# Patient Record
Sex: FEMALE | Race: ASIAN | NOT HISPANIC OR LATINO | URBAN - METROPOLITAN AREA
[De-identification: names, ages, dates, MRNs, and addresses within clinical notes are randomized per-mention and may not be internally consistent; named-entity substitution may affect disease eponyms.]

---

## 2019-11-07 ENCOUNTER — EMERGENCY (EMERGENCY)
Facility: HOSPITAL | Age: 24
LOS: 1 days | Discharge: HOME | End: 2019-11-07
Attending: EMERGENCY MEDICINE
Payer: MEDICAID

## 2019-11-07 VITALS
HEART RATE: 58 BPM | SYSTOLIC BLOOD PRESSURE: 100 MMHG | HEIGHT: 61 IN | RESPIRATION RATE: 18 BRPM | OXYGEN SATURATION: 99 % | WEIGHT: 128.97 LBS | TEMPERATURE: 98 F | DIASTOLIC BLOOD PRESSURE: 66 MMHG

## 2019-11-07 LAB
ALBUMIN SERPL ELPH-MCNC: 4.6 G/DL — SIGNIFICANT CHANGE UP (ref 3.3–5)
ALP SERPL-CCNC: 82 U/L — SIGNIFICANT CHANGE UP (ref 40–120)
ALT FLD-CCNC: 27 U/L — SIGNIFICANT CHANGE UP (ref 10–45)
ANION GAP SERPL CALC-SCNC: 12 MMOL/L — SIGNIFICANT CHANGE UP (ref 5–17)
AST SERPL-CCNC: 24 U/L — SIGNIFICANT CHANGE UP (ref 10–40)
BILIRUB SERPL-MCNC: 0.4 MG/DL — SIGNIFICANT CHANGE UP (ref 0.2–1.2)
BUN SERPL-MCNC: 13 MG/DL — SIGNIFICANT CHANGE UP (ref 7–23)
CALCIUM SERPL-MCNC: 9.8 MG/DL — SIGNIFICANT CHANGE UP (ref 8.4–10.5)
CHLORIDE SERPL-SCNC: 101 MMOL/L — SIGNIFICANT CHANGE UP (ref 96–108)
CO2 SERPL-SCNC: 25 MMOL/L — SIGNIFICANT CHANGE UP (ref 22–31)
CREAT SERPL-MCNC: 0.7 MG/DL — SIGNIFICANT CHANGE UP (ref 0.5–1.3)
GLUCOSE SERPL-MCNC: 85 MG/DL — SIGNIFICANT CHANGE UP (ref 70–99)
HCG UR QL: NEGATIVE — SIGNIFICANT CHANGE UP
HCT VFR BLD CALC: 38.5 % — SIGNIFICANT CHANGE UP (ref 34.5–45)
HGB BLD-MCNC: 12.8 G/DL — SIGNIFICANT CHANGE UP (ref 11.5–15.5)
MAGNESIUM SERPL-MCNC: 2 MG/DL — SIGNIFICANT CHANGE UP (ref 1.6–2.6)
MCHC RBC-ENTMCNC: 29.7 PG — SIGNIFICANT CHANGE UP (ref 27–34)
MCHC RBC-ENTMCNC: 33.2 GM/DL — SIGNIFICANT CHANGE UP (ref 32–36)
MCV RBC AUTO: 89.3 FL — SIGNIFICANT CHANGE UP (ref 80–100)
NRBC # BLD: 0 /100 WBCS — SIGNIFICANT CHANGE UP (ref 0–0)
PLATELET # BLD AUTO: 256 K/UL — SIGNIFICANT CHANGE UP (ref 150–400)
POTASSIUM SERPL-MCNC: 4.2 MMOL/L — SIGNIFICANT CHANGE UP (ref 3.5–5.3)
POTASSIUM SERPL-SCNC: 4.2 MMOL/L — SIGNIFICANT CHANGE UP (ref 3.5–5.3)
PROT SERPL-MCNC: 8.3 G/DL — SIGNIFICANT CHANGE UP (ref 6–8.3)
RBC # BLD: 4.31 M/UL — SIGNIFICANT CHANGE UP (ref 3.8–5.2)
RBC # FLD: 11.5 % — SIGNIFICANT CHANGE UP (ref 10.3–14.5)
SODIUM SERPL-SCNC: 138 MMOL/L — SIGNIFICANT CHANGE UP (ref 135–145)
TSH SERPL-MCNC: 1.24 UIU/ML — SIGNIFICANT CHANGE UP (ref 0.27–4.2)
WBC # BLD: 5.08 K/UL — SIGNIFICANT CHANGE UP (ref 3.8–10.5)
WBC # FLD AUTO: 5.08 K/UL — SIGNIFICANT CHANGE UP (ref 3.8–10.5)

## 2019-11-07 PROCEDURE — 99284 EMERGENCY DEPT VISIT MOD MDM: CPT

## 2019-11-07 PROCEDURE — 84443 ASSAY THYROID STIM HORMONE: CPT

## 2019-11-07 PROCEDURE — 85027 COMPLETE CBC AUTOMATED: CPT

## 2019-11-07 PROCEDURE — 99284 EMERGENCY DEPT VISIT MOD MDM: CPT | Mod: 25

## 2019-11-07 PROCEDURE — 83735 ASSAY OF MAGNESIUM: CPT

## 2019-11-07 PROCEDURE — 93005 ELECTROCARDIOGRAM TRACING: CPT

## 2019-11-07 PROCEDURE — 81025 URINE PREGNANCY TEST: CPT

## 2019-11-07 PROCEDURE — 93010 ELECTROCARDIOGRAM REPORT: CPT

## 2019-11-07 PROCEDURE — 80053 COMPREHEN METABOLIC PANEL: CPT

## 2019-11-07 NOTE — ED PROVIDER NOTE - CLINICAL SUMMARY MEDICAL DECISION MAKING FREE TEXT BOX
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  - Chaz Stoddard MD   --------------------------------------------------------

## 2019-11-07 NOTE — ED PROVIDER NOTE - PHYSICAL EXAMINATION
Well Appearing, Nontoxic, NAD;  Symm Facies, PERRL 3mm, (-)Pallor, Anicteric, MMM;  No JVD/Bruits or stridor;  RRR w/o m/g/r, equal distal pulses;   CTAB w/o w/r/r;   Abd soft, nt/nd, +bs;  No CVAT;  No edema/calf tender;  No rash;  AOX3, Normal speech, normal strength/sensation/gait

## 2019-11-07 NOTE — ED ADULT NURSE NOTE - OBJECTIVE STATEMENT
c/o palpitations Denies chest pain or sob Denies weakness or numbness Denies fever or chills. Made comfortable.

## 2019-11-07 NOTE — ED PROVIDER NOTE - NSFOLLOWUPINSTRUCTIONS_ED_ALL_ED_FT
See Cardiology Clinic (rapid referral) in next week for follow up -- call to discuss.    Stay well hydrated, eat/drink regular healthy meals, get plenty of sleep.    See PALPITATIONS information and return instructions given to you.    Seek immediate medical care for new/worsening symptoms/concerns.

## 2019-11-07 NOTE — ED PROVIDER NOTE - NS ED ROS FT
(+) palpitations  (-) syncope, fevers, weigh loss, chest pain/discomfort, sob/dyspnea, numbness, weakness

## 2019-11-07 NOTE — ED ADULT NURSE NOTE - NSIMPLEMENTINTERV_GEN_ALL_ED
Implemented All Universal Safety Interventions:  Kyle to call system. Call bell, personal items and telephone within reach. Instruct patient to call for assistance. Room bathroom lighting operational. Non-slip footwear when patient is off stretcher. Physically safe environment: no spills, clutter or unnecessary equipment. Stretcher in lowest position, wheels locked, appropriate side rails in place.

## 2019-11-07 NOTE — ED PROVIDER NOTE - PATIENT PORTAL LINK FT
You can access the FollowMyHealth Patient Portal offered by [facility] by registering at the following website: [email or web address]. By joining [employer]’s FollowMyHealth portal, you will also be able to view your health information using other applications (apps) compatible with our system.

## 2019-12-02 DIAGNOSIS — R42 DIZZINESS AND GIDDINESS: ICD-10-CM

## 2019-12-03 ENCOUNTER — APPOINTMENT (OUTPATIENT)
Dept: CARDIOLOGY | Facility: CLINIC | Age: 24
End: 2019-12-03

## 2022-02-02 NOTE — ED ADULT NURSE NOTE - NS ED NURSE LEVEL OF CONSCIOUSNESS AFFECT
Thank you for your visit today. I think the cause of recurrent UTI with candida is the high blood sugar and enlarged prostate. I will send a not to your PCP to see about addressing these. Meanwhile please discontinue the Fluconazole and I will order a urine test in about 1.5 weeks to see what bis growing. I would like tyo see you back in 3 weeks. Please feel free to call with questions.   Calm

## 2023-01-06 ENCOUNTER — APPOINTMENT (OUTPATIENT)
Dept: OBGYN | Facility: CLINIC | Age: 28
End: 2023-01-06
Payer: MEDICAID

## 2023-01-06 ENCOUNTER — APPOINTMENT (OUTPATIENT)
Dept: ANTEPARTUM | Facility: CLINIC | Age: 28
End: 2023-01-06
Payer: MEDICAID

## 2023-01-06 VITALS — SYSTOLIC BLOOD PRESSURE: 130 MMHG | WEIGHT: 147.6 LBS | DIASTOLIC BLOOD PRESSURE: 86 MMHG

## 2023-01-06 PROCEDURE — 76801 OB US < 14 WKS SINGLE FETUS: CPT | Mod: 59

## 2023-01-06 PROCEDURE — 99204 OFFICE O/P NEW MOD 45 MIN: CPT

## 2023-01-06 PROCEDURE — 76813 OB US NUCHAL MEAS 1 GEST: CPT

## 2023-01-06 NOTE — PHYSICAL EXAM
[Appropriately responsive] : appropriately responsive [Alert] : alert [No Acute Distress] : no acute distress [Soft] : soft [Non-tender] : non-tender [Non-distended] : non-distended [No HSM] : No HSM [No Lesions] : no lesions [No Mass] : no mass [Oriented x3] : oriented x3 [Labia Majora] : normal [Labia Minora] : normal [Normal] : normal [Uterine Adnexae] : normal [FreeTextEntry4] : p

## 2023-01-06 NOTE — HISTORY OF PRESENT ILLNESS
[Patient reported PAP Smear was normal] : Patient reported PAP Smear was normal [FreeTextEntry1] : Patient is a 26 y/o . LMP September. She had a positive home pregnancy test. Patient has h/o heart palpitations - was seen by cards in the past - noticed worsening palpitations since pregnancy began.\par She was seen by her GYN in March and had an annual exam. She is endorsing occasional nausea and breast tenderness.  [PapSmeardate] : 3/2022

## 2023-01-06 NOTE — DISCUSSION/SUMMARY
[FreeTextEntry1] : 26 y/o P0 at 12w6d by LMP presenting with amenorrhea\par -f/u pap, GCT/CT\par -f/u prenatal blood work and NIPT drawn today\par -Taking PNV\par -Routine PNC reviewed including nutrition, exercise and safe medications in pregnancy \par -High Risk Conditions: BP mild range - will monitor, palpitations - referral given for cardiology\par -f/u 4 weeks for AFP

## 2023-01-08 LAB
APPEARANCE: ABNORMAL
BACTERIA: NEGATIVE
BASOPHILS # BLD AUTO: 0.02 K/UL
BASOPHILS NFR BLD AUTO: 0.2 %
BILIRUBIN URINE: NEGATIVE
BLOOD URINE: NEGATIVE
C TRACH RRNA SPEC QL NAA+PROBE: NOT DETECTED
COLOR: YELLOW
EOSINOPHIL # BLD AUTO: 0.11 K/UL
EOSINOPHIL NFR BLD AUTO: 1.3 %
ESTIMATED AVERAGE GLUCOSE: 108 MG/DL
GLUCOSE QUALITATIVE U: NEGATIVE
GLUCOSE SERPL-MCNC: 112 MG/DL
HAV IGM SER QL: NONREACTIVE
HBA1C MFR BLD HPLC: 5.4 %
HBV CORE IGM SER QL: NONREACTIVE
HBV SURFACE AG SER QL: NONREACTIVE
HCT VFR BLD CALC: 40.6 %
HCV AB SER QL: NONREACTIVE
HCV S/CO RATIO: 0.1 S/CO
HGB BLD-MCNC: 12.9 G/DL
HIV1+2 AB SPEC QL IA.RAPID: NONREACTIVE
HYALINE CASTS: 3 /LPF
IMM GRANULOCYTES NFR BLD AUTO: 0.2 %
KETONES URINE: NEGATIVE
LEUKOCYTE ESTERASE URINE: NEGATIVE
LYMPHOCYTES # BLD AUTO: 1.9 K/UL
LYMPHOCYTES NFR BLD AUTO: 21.6 %
MAN DIFF?: NORMAL
MCHC RBC-ENTMCNC: 30.8 PG
MCHC RBC-ENTMCNC: 31.8 GM/DL
MCV RBC AUTO: 96.9 FL
MEV IGG FLD QL IA: <5 AU/ML
MEV IGG+IGM SER-IMP: NEGATIVE
MICROSCOPIC-UA: NORMAL
MONOCYTES # BLD AUTO: 0.53 K/UL
MONOCYTES NFR BLD AUTO: 6 %
MUV AB SER-ACNC: NEGATIVE
MUV IGG SER QL IA: 7.4 AU/ML
N GONORRHOEA RRNA SPEC QL NAA+PROBE: NOT DETECTED
NEUTROPHILS # BLD AUTO: 6.22 K/UL
NEUTROPHILS NFR BLD AUTO: 70.7 %
NITRITE URINE: NEGATIVE
PH URINE: 7.5
PLATELET # BLD AUTO: 318 K/UL
PROTEIN URINE: ABNORMAL
RBC # BLD: 4.19 M/UL
RBC # FLD: 13.2 %
RED BLOOD CELLS URINE: 5 /HPF
RUBV IGG FLD-ACNC: 1.7 INDEX
RUBV IGG SER-IMP: POSITIVE
SOURCE AMPLIFICATION: NORMAL
SPECIFIC GRAVITY URINE: 1.02
SQUAMOUS EPITHELIAL CELLS: 3 /HPF
T PALLIDUM AB SER QL IA: NEGATIVE
T4 FREE SERPL-MCNC: 1 NG/DL
TSH SERPL-ACNC: 1.52 UIU/ML
URINE COMMENTS: NORMAL
UROBILINOGEN URINE: NORMAL
WBC # FLD AUTO: 8.8 K/UL
WHITE BLOOD CELLS URINE: 2 /HPF

## 2023-01-10 LAB
ABO + RH PNL BLD: NORMAL
AR GENE MUT ANL BLD/T: NORMAL
BACTERIA UR CULT: NORMAL
HGB A MFR BLD: 97.5 %
HGB A2 MFR BLD: 2.5 %
HGB FRACT BLD-IMP: NORMAL
LEAD BLD-MCNC: <1 UG/DL
M TB IFN-G BLD-IMP: NEGATIVE
QUANTIFERON TB PLUS MITOGEN MINUS NIL: >10 IU/ML
QUANTIFERON TB PLUS NIL: 0.07 IU/ML
QUANTIFERON TB PLUS TB1 MINUS NIL: -0.01 IU/ML
QUANTIFERON TB PLUS TB2 MINUS NIL: 0 IU/ML

## 2023-01-12 ENCOUNTER — TRANSCRIPTION ENCOUNTER (OUTPATIENT)
Age: 28
End: 2023-01-12

## 2023-01-17 LAB
ALL CHROMOSOMES INTERPRETATION: NORMAL
ALL CHROMOSOMES TEST RESULT: NORMAL
CFTR MUT TESTED BLD/T: NEGATIVE
CHROMOSOME13 INTERPRETATION: NORMAL
CHROMOSOME13 TEST RESULT: NORMAL
CHROMOSOME18 INTERPRETATION: NORMAL
CHROMOSOME18 TEST RESULT: NORMAL
CHROMOSOME21 INTERPRETATION: NORMAL
CHROMOSOME21 TEST RESULT: NORMAL
FETAL FRACTION: NORMAL
MICRODELETIONS INTERPRETATION: NORMAL
MICRODELETIONS RESULT: NORMAL
PERFORMANCE AND LIMITATIONS: NORMAL
SEX CHROMOSOME INTERPRETATION: NORMAL
SEX CHROMOSOME TEST RESULT: NORMAL
VERIFI PRENATAL TEST: NOT DETECTED

## 2023-01-22 LAB — FMR1 GENE MUT ANL BLD/T: NORMAL

## 2023-01-30 ENCOUNTER — NON-APPOINTMENT (OUTPATIENT)
Age: 28
End: 2023-01-30

## 2023-02-03 ENCOUNTER — APPOINTMENT (OUTPATIENT)
Dept: OBGYN | Facility: CLINIC | Age: 28
End: 2023-02-03
Payer: MEDICAID

## 2023-02-03 VITALS — SYSTOLIC BLOOD PRESSURE: 126 MMHG | DIASTOLIC BLOOD PRESSURE: 83 MMHG | WEIGHT: 152 LBS

## 2023-02-03 PROCEDURE — 99213 OFFICE O/P EST LOW 20 MIN: CPT | Mod: TH

## 2023-02-06 ENCOUNTER — TRANSCRIPTION ENCOUNTER (OUTPATIENT)
Age: 28
End: 2023-02-06

## 2023-02-08 LAB
AFP MOM: 0.53
AFP VALUE: 28.7 NG/ML
ALPHA FETOPROTEIN SERUM COMMENT: NORMAL
ALPHA FETOPROTEIN SERUM INTERPRETATION: NORMAL
ALPHA FETOPROTEIN SERUM RESULTS: NORMAL
ALPHA FETOPROTEIN SERUM TEST RESULTS: NORMAL
BACTERIA UR CULT: NORMAL
GESTATIONAL AGE BASED ON: NORMAL
GESTATIONAL AGE ON COLLECTION DATE: 19.4 WEEKS
INSULIN DEP DIABETES: NO
MATERNAL AGE AT EDD AFP: 28 YR
MULTIPLE GESTATION: NO
OSBR RISK 1 IN: NORMAL
RACE: NORMAL
WEIGHT AFP: 152 LBS

## 2023-02-28 ENCOUNTER — NON-APPOINTMENT (OUTPATIENT)
Age: 28
End: 2023-02-28

## 2023-03-03 ENCOUNTER — APPOINTMENT (OUTPATIENT)
Dept: ANTEPARTUM | Facility: CLINIC | Age: 28
End: 2023-03-03
Payer: MEDICAID

## 2023-03-03 ENCOUNTER — APPOINTMENT (OUTPATIENT)
Dept: OBGYN | Facility: CLINIC | Age: 28
End: 2023-03-03
Payer: MEDICAID

## 2023-03-03 ENCOUNTER — TRANSCRIPTION ENCOUNTER (OUTPATIENT)
Age: 28
End: 2023-03-03

## 2023-03-03 VITALS
BODY MASS INDEX: 29.83 KG/M2 | WEIGHT: 158 LBS | SYSTOLIC BLOOD PRESSURE: 123 MMHG | DIASTOLIC BLOOD PRESSURE: 77 MMHG | HEIGHT: 61 IN

## 2023-03-03 DIAGNOSIS — N88.3 INCOMPETENCE OF CERVIX UTERI: ICD-10-CM

## 2023-03-03 PROCEDURE — 76817 TRANSVAGINAL US OBSTETRIC: CPT | Mod: 59

## 2023-03-03 PROCEDURE — 99213 OFFICE O/P EST LOW 20 MIN: CPT | Mod: TH

## 2023-03-03 PROCEDURE — 76811 OB US DETAILED SNGL FETUS: CPT

## 2023-03-03 RX ORDER — PROGESTERONE 200 MG/1
200 CAPSULE ORAL DAILY
Qty: 30 | Refills: 4 | Status: ACTIVE | COMMUNITY
Start: 2023-03-03 | End: 1900-01-01

## 2023-03-04 ENCOUNTER — NON-APPOINTMENT (OUTPATIENT)
Age: 28
End: 2023-03-04

## 2023-03-04 ENCOUNTER — INPATIENT (INPATIENT)
Facility: HOSPITAL | Age: 28
LOS: 2 days | Discharge: HOME | End: 2023-03-07
Attending: OBSTETRICS & GYNECOLOGY | Admitting: OBSTETRICS & GYNECOLOGY
Payer: MEDICAID

## 2023-03-04 VITALS
TEMPERATURE: 99 F | DIASTOLIC BLOOD PRESSURE: 80 MMHG | RESPIRATION RATE: 18 BRPM | HEART RATE: 96 BPM | SYSTOLIC BLOOD PRESSURE: 129 MMHG

## 2023-03-04 DIAGNOSIS — N88.3 INCOMPETENCE OF CERVIX UTERI: ICD-10-CM

## 2023-03-04 DIAGNOSIS — O26.899 OTHER SPECIFIED PREGNANCY RELATED CONDITIONS, UNSPECIFIED TRIMESTER: ICD-10-CM

## 2023-03-04 LAB
APPEARANCE UR: ABNORMAL
BACTERIA # UR AUTO: ABNORMAL
BASOPHILS # BLD AUTO: 0.03 K/UL — SIGNIFICANT CHANGE UP (ref 0–0.2)
BASOPHILS NFR BLD AUTO: 0.3 % — SIGNIFICANT CHANGE UP (ref 0–2)
BILIRUB UR-MCNC: NEGATIVE — SIGNIFICANT CHANGE UP
BLD GP AB SCN SERPL QL: NEGATIVE — SIGNIFICANT CHANGE UP
COLOR SPEC: YELLOW — SIGNIFICANT CHANGE UP
COMMENT - URINE: SIGNIFICANT CHANGE UP
DIFF PNL FLD: NEGATIVE — SIGNIFICANT CHANGE UP
EOSINOPHIL # BLD AUTO: 0.19 K/UL — SIGNIFICANT CHANGE UP (ref 0–0.5)
EOSINOPHIL NFR BLD AUTO: 1.6 % — SIGNIFICANT CHANGE UP (ref 0–6)
EPI CELLS # UR: 10 /HPF — HIGH (ref 0–5)
GLUCOSE UR QL: NEGATIVE — SIGNIFICANT CHANGE UP
HCT VFR BLD CALC: 36.8 % — SIGNIFICANT CHANGE UP (ref 34.5–45)
HGB BLD-MCNC: 11.8 G/DL — SIGNIFICANT CHANGE UP (ref 11.5–15.5)
HYALINE CASTS # UR AUTO: 1 /LPF — SIGNIFICANT CHANGE UP (ref 0–7)
IANC: 7.76 K/UL — HIGH (ref 1.8–7.4)
IMM GRANULOCYTES NFR BLD AUTO: 0.4 % — SIGNIFICANT CHANGE UP (ref 0–0.9)
KETONES UR-MCNC: NEGATIVE — SIGNIFICANT CHANGE UP
LEUKOCYTE ESTERASE UR-ACNC: ABNORMAL
LYMPHOCYTES # BLD AUTO: 2.58 K/UL — SIGNIFICANT CHANGE UP (ref 1–3.3)
LYMPHOCYTES # BLD AUTO: 22.3 % — SIGNIFICANT CHANGE UP (ref 13–44)
MCHC RBC-ENTMCNC: 29.9 PG — SIGNIFICANT CHANGE UP (ref 27–34)
MCHC RBC-ENTMCNC: 32.1 GM/DL — SIGNIFICANT CHANGE UP (ref 32–36)
MCV RBC AUTO: 93.4 FL — SIGNIFICANT CHANGE UP (ref 80–100)
MONOCYTES # BLD AUTO: 0.97 K/UL — HIGH (ref 0–0.9)
MONOCYTES NFR BLD AUTO: 8.4 % — SIGNIFICANT CHANGE UP (ref 2–14)
NEUTROPHILS # BLD AUTO: 7.76 K/UL — HIGH (ref 1.8–7.4)
NEUTROPHILS NFR BLD AUTO: 67 % — SIGNIFICANT CHANGE UP (ref 43–77)
NITRITE UR-MCNC: NEGATIVE — SIGNIFICANT CHANGE UP
NRBC # BLD: 0 /100 WBCS — SIGNIFICANT CHANGE UP (ref 0–0)
NRBC # FLD: 0 K/UL — SIGNIFICANT CHANGE UP (ref 0–0)
PH UR: 7.5 — SIGNIFICANT CHANGE UP (ref 5–8)
PLATELET # BLD AUTO: 339 K/UL — SIGNIFICANT CHANGE UP (ref 150–400)
PROT UR-MCNC: ABNORMAL
RBC # BLD: 3.94 M/UL — SIGNIFICANT CHANGE UP (ref 3.8–5.2)
RBC # FLD: 12.3 % — SIGNIFICANT CHANGE UP (ref 10.3–14.5)
RBC CASTS # UR COMP ASSIST: 3 /HPF — SIGNIFICANT CHANGE UP (ref 0–4)
RH IG SCN BLD-IMP: POSITIVE — SIGNIFICANT CHANGE UP
RH IG SCN BLD-IMP: POSITIVE — SIGNIFICANT CHANGE UP
SARS-COV-2 RNA SPEC QL NAA+PROBE: SIGNIFICANT CHANGE UP
SP GR SPEC: 1.01 — SIGNIFICANT CHANGE UP (ref 1.01–1.05)
UROBILINOGEN FLD QL: SIGNIFICANT CHANGE UP
WBC # BLD: 11.58 K/UL — HIGH (ref 3.8–10.5)
WBC # FLD AUTO: 11.58 K/UL — HIGH (ref 3.8–10.5)
WBC UR QL: 16 /HPF — HIGH (ref 0–5)

## 2023-03-04 PROCEDURE — 59320 REVISION OF CERVIX: CPT

## 2023-03-04 NOTE — OB RN PATIENT PROFILE - ALERT: PERTINENT HISTORY
Fetal Sonogram/1st Trimester Sonogram/20 Week Level II Sonogram/Follow up Sonogram for Growth Fetal Sonogram/1st Trimester Sonogram/20 Week Level II Sonogram/Follow up Sonogram for Growth/Ultra Screen at 12 Weeks

## 2023-03-04 NOTE — OB PROVIDER TRIAGE NOTE - HISTORY OF PRESENT ILLNESS
26yo  @ 21.0 presents with c/o lower abdominal pressure since this morning and increase in vaginal discharge.  Patient was seen in office yesterday and found to have cervical length of 1.5cm with funneling. Patient was started on vaginal progesterone yesterday.   Denies LOF, VB, dysuria  LMP 10/8/2022    H/O Cardiac Arrthymia (PVC's)- was seen by cards, no interventions needed. To follow up this pregnancy

## 2023-03-04 NOTE — OB PROVIDER H&P - NSLOWPPHRISK_OBGYN_A_OB
No previous uterine incision/[place] Pregnancy/Less than or equal to 4 previous vaginal births/No known bleeding disorder/No history of postpartum hemorrhage/No other PPH risks indicated

## 2023-03-04 NOTE — OB RN PATIENT PROFILE - NS_SOCIALWORKCONSULTREASON_OBGYN_ALL_OB_FT
21 week dilation - possible demise Azathioprine Counseling:  I discussed with the patient the risks of azathioprine including but not limited to myelosuppression, immunosuppression, hepatotoxicity, lymphoma, and infections.  The patient understands that monitoring is required including baseline LFTs, Creatinine, possible TPMP genotyping and weekly CBCs for the first month and then every 2 weeks thereafter.  The patient verbalized understanding of the proper use and possible adverse effects of azathioprine.  All of the patient's questions and concerns were addressed.

## 2023-03-04 NOTE — OB PROVIDER H&P - NS_PLACENTALOCAL_OBGYN_ALL_OB
Posterior Alert-The patient is alert, awake and responds to voice. The patient is oriented to time, place, and person. The triage nurse is able to obtain subjective information.

## 2023-03-04 NOTE — OB PROVIDER H&P - ASSESSMENT
Pt placed on cardiac monitor, BP cuff, and pulse ox. Alarms set.       Amrit Moya RN  05/02/19 1777 Admit for Cervical incompetence  For Observation/possible cerclage  Plan D/W MFM by Dr. Moss  For MFM consult in AM  [place] x 1 hour  NPO at midnight  Covid 19 PCR  D/W Dr. Edwards Admit for Cervical incompetence  For Observation/possible cerclage  Plan D/W MFM by Dr. Moss  For MFM consult in AM  [place] x 1 hour  NPO at midnight  Covid 19 PCR  D/W Dr. Edwards

## 2023-03-04 NOTE — OB RN PATIENT PROFILE - SPIRITUAL ADVISOR NOTIFIED, PROFILE
HPI:    Patient ID: Junior Dillon is a 79year old male. HPI   Diabetes  Patient here for follow up of Diabetes. Has been taking medications regularly. Currently taking metformin/lgipizide  Checks sugars 2 times daily.   Fasting sugars average 160-18 Oral Tab  Disp:  Rfl:    aspirin 81 MG Oral Tab  Disp:  Rfl:    Magnesium 500 MG Oral Cap  Disp:  Rfl:    Olmesartan Medoxomil 5 MG Oral Tab Take 5 mg by mouth daily. Disp:  Rfl:    Vitamin D3 1000 units Oral Tab Take 1,000 Units by mouth daily.  Disp:  Rfl mellitus without complication, without long-term current use of insulin ([facility] [place] [address]    Patient overall doing quite well. He needs labs done, will order fasting.    Will give prevnar today        VU#[id number] no

## 2023-03-04 NOTE — OB PROVIDER TRIAGE NOTE - NSHPPHYSICALEXAM_GEN_ALL_CORE
Assessment reveals VSS, abdomen soft, NT.   SSE performed by Dr. Orellana- cervix appears to be dilated 2cm.   No ctx on toco  TAS- +, MVP 4.2, posterior placenta.

## 2023-03-04 NOTE — OB PROVIDER TRIAGE NOTE - NS_OBGYNHISTORY_OBGYN_ALL_OB_FT
Ap course complicated by cervical insufficiency diagnosed yesterday.  CL was 1.5cm  Vaginal progesterone started yesterday.

## 2023-03-05 LAB
B PERT IGG+IGM PNL SER: CLEAR — SIGNIFICANT CHANGE UP
COLOR FLD: YELLOW
COVID-19 SPIKE DOMAIN AB INTERP: POSITIVE
COVID-19 SPIKE DOMAIN ANTIBODY RESULT: >250 U/ML — HIGH
FLUID INTAKE SUBSTANCE CLASS: SIGNIFICANT CHANGE UP
GLUCOSE FLD-MCNC: 39 MG/DL — SIGNIFICANT CHANGE UP
GRAM STN FLD: SIGNIFICANT CHANGE UP
NEUTROPHILS-BODY FLUID: 0 % — SIGNIFICANT CHANGE UP
OTHER CELLS FLD MANUAL: SIGNIFICANT CHANGE UP %
RCV VOL RI: 1000 CELLS/UL — HIGH (ref 0–5)
SARS-COV-2 IGG+IGM SERPL QL IA: >250 U/ML — HIGH
SARS-COV-2 IGG+IGM SERPL QL IA: POSITIVE
SPECIMEN SOURCE FLD: SIGNIFICANT CHANGE UP
SPECIMEN SOURCE: SIGNIFICANT CHANGE UP
TOTAL NUCLEATED CELL COUNT, BODY FLUID: 148 CELLS/UL — HIGH (ref 0–5)
TUBE TYPE: SIGNIFICANT CHANGE UP

## 2023-03-05 PROCEDURE — 99231 SBSQ HOSP IP/OBS SF/LOW 25: CPT | Mod: GC

## 2023-03-05 RX ORDER — FAMOTIDINE 10 MG/ML
20 INJECTION INTRAVENOUS
Refills: 0 | Status: DISCONTINUED | OUTPATIENT
Start: 2023-03-05 | End: 2023-03-07

## 2023-03-05 RX ORDER — SODIUM CHLORIDE 9 MG/ML
1000 INJECTION, SOLUTION INTRAVENOUS
Refills: 0 | Status: DISCONTINUED | OUTPATIENT
Start: 2023-03-06 | End: 2023-03-06

## 2023-03-05 RX ORDER — INDOMETHACIN 50 MG
25 CAPSULE ORAL EVERY 6 HOURS
Refills: 0 | Status: DISCONTINUED | OUTPATIENT
Start: 2023-03-05 | End: 2023-03-07

## 2023-03-05 RX ORDER — INDOMETHACIN 50 MG
50 CAPSULE ORAL ONCE
Refills: 0 | Status: COMPLETED | OUTPATIENT
Start: 2023-03-05 | End: 2023-03-05

## 2023-03-05 RX ADMIN — Medication 50 MILLIGRAM(S): at 13:45

## 2023-03-05 RX ADMIN — Medication 50 MILLIGRAM(S): at 12:43

## 2023-03-05 RX ADMIN — Medication 25 MILLIGRAM(S): at 18:42

## 2023-03-05 RX ADMIN — FAMOTIDINE 20 MILLIGRAM(S): 10 INJECTION INTRAVENOUS at 18:42

## 2023-03-05 NOTE — PROGRESS NOTE ADULT - ATTENDING COMMENTS
Patient seen and examined  Patient with advanced cervical dilatation with rapid progression. Now cervix is 3 cms dilated with membranes past the external os  Had extensive discussion about pros and cons of cerclage and after consent amniocentesis was done to rule out infection and amnio reduction

## 2023-03-05 NOTE — PROGRESS NOTE ADULT - SUBJECTIVE AND OBJECTIVE BOX
R3 Antepartum Note, HD#2    Patient seen and examined at bedside, no acute overnight events. No acute complaints. Pt reports +FM, denies LOF, VB, ctx, HA, epigastric pain, blurred vision, CP, SOB, N/V, fevers, and chills.    Vital Signs Last 24 Hours  T(C): 36.8 (03-04-23 @ 22:00), Max: 37 (03-04-23 @ 18:13)  HR: 95 (03-05-23 @ 00:28) (76 - 96)  BP: 110/58 (03-05-23 @ 00:28) (110/58 - 129/80)  RR: 16 (03-04-23 @ 19:22) (16 - 18)  SpO2: --    CAPILLARY BLOOD GLUCOSE    Physical Exam:  General: NAD  Abdomen: Soft, non-tender, gravid  Ext: No pain or swelling    [place] quiet     Labs:             11.8   11.58 )-----------( 339      ( 03-04 @ 19:09 )             36.8       MEDICATIONS  (STANDING):  prenatal multivitamin 1 Tablet(s) Oral daily

## 2023-03-05 NOTE — PROGRESS NOTE ADULT - ASSESSMENT
28yo  at 21w1d a/w cervical dilation and vaginal pressure (SSE cervix visually appears 2cm). Patient has been stable overnight.     #Fetal wellbeing  - FH Check daily   - Possible Amniocentesis in AM   - Possible rescue cerclage on Monday     #Maternal wellbeing  - Regular diet   - SCDs for DVT ppx  - PNV/Iron/Colace/Folic acid  - f/u UCx    Ayse Gallardo PGY3 28yo  at 21w1d a/w cervical dilation and vaginal pressure (SSE cervix visually appears 2cm). Patient has been stable overnight.     #Fetal wellbeing  - FH Check daily   - Possible Amniocentesis in AM   - Possible rescue cerclage on Monday     #Maternal wellbeing  - continue close observation   - Regular diet   - SCDs for DVT ppx  - PNV/Iron/Colace/Folic acid  - f/u UCx    Ayse Gallardo PGY3 26yo  at 21w1d a/w cervical dilation and vaginal pressure (SSE cervix visually appears 2cm). Patient has been stable overnight.     #Fetal wellbeing  - FH Check daily   - Possible Amniocentesis in AM   - Possible rescue cerclage on Monday     #Maternal wellbeing  - continue close observation   - Regular diet   - SCDs for DVT ppx  - PNV/Iron/Colace/Folic acid  - f/u UCx    Ayse Tarrash PGY3    MFM Addendum  26yo  at 21w1d a/w cervical dilation and vaginal pressure. Patient was visually 2cm yesterday. This morning at 11am, repeat SSE was performed. SSE showed cervix about 3cm dilated with membranes bulging passed the external os and very thin visually. Patient was counseled extensively on findings and on options going forward. We discussed that given there was a change in cervical exam, we are concerned for progression and possible miscarriage naturally. We discussed the option for cerclage placement and risks and benefits. She was counseled that at this time there are low likelihood of success. Additionally, we discussed that success for a cerclage means prolonging pregnancy about 8 weeks at which time she will still be . We discussed possibility of sandra- viable birth following cerclage placement and possibility of having a  with long term disabilities. Additionally, we discussed the risk of infection to mom and the baby, particularly now that the membranes are exposed to the vaginal canal. A third option of termination was discussed as well. Patient and  verbalized understanding of the options. We discussed that if they are considering cerclage placement we would like to do an amniocentesis prior in order to check for signs of infection. We would then observe over night for re-exam in the morning. Patient and  have elected for amniocentesis in order to observe risk of infection. Risks/benefits were discussed and plan to send fluid for testing including genetic testing.     Patient was seen and counseled with Dr. Wharton at bedside.   Carly Hirschberg, MFM Fellow

## 2023-03-06 ENCOUNTER — APPOINTMENT (OUTPATIENT)
Dept: ANTEPARTUM | Facility: CLINIC | Age: 28
End: 2023-03-06

## 2023-03-06 ENCOUNTER — TRANSCRIPTION ENCOUNTER (OUTPATIENT)
Age: 28
End: 2023-03-06

## 2023-03-06 ENCOUNTER — APPOINTMENT (OUTPATIENT)
Dept: OBGYN | Facility: CLINIC | Age: 28
End: 2023-03-06

## 2023-03-06 DIAGNOSIS — Z04.9 ENCOUNTER FOR EXAMINATION AND OBSERVATION FOR UNSPECIFIED REASON: ICD-10-CM

## 2023-03-06 LAB
AMNISURE ROM (RUPTURE OF MEMBRANES): NEGATIVE — SIGNIFICANT CHANGE UP
CULTURE RESULTS: SIGNIFICANT CHANGE UP
HCT VFR BLD CALC: 34.8 % — SIGNIFICANT CHANGE UP (ref 34.5–45)
HGB BLD-MCNC: 11.3 G/DL — LOW (ref 11.5–15.5)
MCHC RBC-ENTMCNC: 30.3 PG — SIGNIFICANT CHANGE UP (ref 27–34)
MCHC RBC-ENTMCNC: 32.5 GM/DL — SIGNIFICANT CHANGE UP (ref 32–36)
MCV RBC AUTO: 93.3 FL — SIGNIFICANT CHANGE UP (ref 80–100)
NRBC # BLD: 0 /100 WBCS — SIGNIFICANT CHANGE UP (ref 0–0)
NRBC # FLD: 0 K/UL — SIGNIFICANT CHANGE UP (ref 0–0)
PLATELET # BLD AUTO: 321 K/UL — SIGNIFICANT CHANGE UP (ref 150–400)
RBC # BLD: 3.73 M/UL — LOW (ref 3.8–5.2)
RBC # FLD: 12.5 % — SIGNIFICANT CHANGE UP (ref 10.3–14.5)
SPECIMEN SOURCE: SIGNIFICANT CHANGE UP
WBC # BLD: 11.3 K/UL — HIGH (ref 3.8–10.5)
WBC # FLD AUTO: 11.3 K/UL — HIGH (ref 3.8–10.5)

## 2023-03-06 RX ORDER — CITRIC ACID/SODIUM CITRATE 300-500 MG
30 SOLUTION, ORAL ORAL ONCE
Refills: 0 | Status: COMPLETED | OUTPATIENT
Start: 2023-03-06 | End: 2023-03-06

## 2023-03-06 RX ORDER — SODIUM CHLORIDE 9 MG/ML
1000 INJECTION, SOLUTION INTRAVENOUS ONCE
Refills: 0 | Status: COMPLETED | OUTPATIENT
Start: 2023-03-06 | End: 2023-03-06

## 2023-03-06 RX ORDER — CEFAZOLIN SODIUM 1 G
2000 VIAL (EA) INJECTION ONCE
Refills: 0 | Status: COMPLETED | OUTPATIENT
Start: 2023-03-06 | End: 2023-03-06

## 2023-03-06 RX ORDER — METRONIDAZOLE 500 MG
TABLET ORAL
Refills: 0 | Status: DISCONTINUED | OUTPATIENT
Start: 2023-03-06 | End: 2023-03-07

## 2023-03-06 RX ORDER — METRONIDAZOLE 500 MG
500 TABLET ORAL ONCE
Refills: 0 | Status: COMPLETED | OUTPATIENT
Start: 2023-03-06 | End: 2023-03-06

## 2023-03-06 RX ORDER — FAMOTIDINE 10 MG/ML
20 INJECTION INTRAVENOUS ONCE
Refills: 0 | Status: COMPLETED | OUTPATIENT
Start: 2023-03-06 | End: 2023-03-06

## 2023-03-06 RX ORDER — METRONIDAZOLE 500 MG
500 TABLET ORAL EVERY 8 HOURS
Refills: 0 | Status: DISCONTINUED | OUTPATIENT
Start: 2023-03-06 | End: 2023-03-07

## 2023-03-06 RX ORDER — POLYETHYLENE GLYCOL 3350 17 G/17G
17 POWDER, FOR SOLUTION ORAL DAILY
Refills: 0 | Status: DISCONTINUED | OUTPATIENT
Start: 2023-03-06 | End: 2023-03-07

## 2023-03-06 RX ORDER — CEFAZOLIN SODIUM 1 G
VIAL (EA) INJECTION
Refills: 0 | Status: DISCONTINUED | OUTPATIENT
Start: 2023-03-06 | End: 2023-03-07

## 2023-03-06 RX ORDER — CEFAZOLIN SODIUM 1 G
2000 VIAL (EA) INJECTION EVERY 8 HOURS
Refills: 0 | Status: DISCONTINUED | OUTPATIENT
Start: 2023-03-06 | End: 2023-03-07

## 2023-03-06 RX ORDER — ACETAMINOPHEN 500 MG
1000 TABLET ORAL ONCE
Refills: 0 | Status: COMPLETED | OUTPATIENT
Start: 2023-03-06 | End: 2023-03-06

## 2023-03-06 RX ADMIN — Medication 30 MILLILITER(S): at 07:52

## 2023-03-06 RX ADMIN — Medication 1000 MILLIGRAM(S): at 10:35

## 2023-03-06 RX ADMIN — Medication 1 TABLET(S): at 13:17

## 2023-03-06 RX ADMIN — Medication 100 MILLIGRAM(S): at 16:56

## 2023-03-06 RX ADMIN — Medication 100 MILLIGRAM(S): at 23:31

## 2023-03-06 RX ADMIN — Medication 25 MILLIGRAM(S): at 01:26

## 2023-03-06 RX ADMIN — POLYETHYLENE GLYCOL 3350 17 GRAM(S): 17 POWDER, FOR SOLUTION ORAL at 22:12

## 2023-03-06 RX ADMIN — Medication 25 MILLIGRAM(S): at 19:52

## 2023-03-06 RX ADMIN — SODIUM CHLORIDE 1000 MILLILITER(S): 9 INJECTION, SOLUTION INTRAVENOUS at 07:30

## 2023-03-06 RX ADMIN — Medication 25 MILLIGRAM(S): at 13:17

## 2023-03-06 RX ADMIN — FAMOTIDINE 20 MILLIGRAM(S): 10 INJECTION INTRAVENOUS at 19:53

## 2023-03-06 RX ADMIN — Medication 400 MILLIGRAM(S): at 10:10

## 2023-03-06 RX ADMIN — Medication 25 MILLIGRAM(S): at 07:30

## 2023-03-06 RX ADMIN — Medication 100 MILLIGRAM(S): at 15:55

## 2023-03-06 RX ADMIN — FAMOTIDINE 20 MILLIGRAM(S): 10 INJECTION INTRAVENOUS at 07:52

## 2023-03-06 RX ADMIN — Medication 25 MILLIGRAM(S): at 13:45

## 2023-03-06 NOTE — BRIEF OPERATIVE NOTE - NSICDXBRIEFPREOP_GEN_ALL_CORE_FT
PRE-OP DIAGNOSIS:  Cervical insufficiency in pregnancy, antepartum, second trimester 06-Mar-2023 09:10:09  Kathie Min

## 2023-03-06 NOTE — PROGRESS NOTE ADULT - SUBJECTIVE AND OBJECTIVE BOX
Patient seen and examined  Speculum exam done  Cervix still about 2-3 cms dilated with small amount of transudate noted, negative for ferning and nitrazine  Patient again explained the risks of cerclage including rupture of membrane during procedure  PAtient signed consent and will proceed for cerclage

## 2023-03-06 NOTE — OB RN INTRAOPERATIVE NOTE - NSSKINPREPOTHER1_OBGYN_ALL_OB_FT
Consult with Dr. Martha Ahumada Patient informed on all options OS.  If Basic consider near goal to balance eyes. n/a - cerclage placement

## 2023-03-06 NOTE — BRIEF OPERATIVE NOTE - NSICDXBRIEFPOSTOP_GEN_ALL_CORE_FT
POST-OP DIAGNOSIS:  Cervical insufficiency in pregnancy, antepartum, second trimester 06-Mar-2023 09:10:23  Kathie Min

## 2023-03-06 NOTE — PROGRESS NOTE ADULT - SUBJECTIVE AND OBJECTIVE BOX
R3 Antepartum Note, HD#3    Interval events:    Patient seen and examined at bedside, no acute overnight events. S/p amnioreduction 3/5. No evidence of infected amniotic fluid on prelim results. No acute complaints. Pt reports denies LOF, VB, ctx, HA, epigastric pain, blurred vision, CP, SOB, N/V, fevers, and chills.      Vital Signs Last 24 Hours  T(C): 36.5 (03-06-23 @ 01:27), Max: 37.0 (03-05-23 @ 16:54)  HR: 77 (03-06-23 @ 01:27) (71 - 86)  BP: 98/55 (03-06-23 @ 01:27) (98/55 - 121/64)  RR: 16 (03-05-23 @ 18:00) (16 - 18)  SpO2: --    Physical Exam:  General: NAD  Abdomen: Soft, non-tender, gravid  Ext: No pain or swelling    Labs:             11.8   11.58 )-----------( 339      ( 03-04 @ 19:09 )             36.8         MEDICATIONS  (STANDING):  famotidine    Tablet 20 milliGRAM(s) Oral two times a day  indomethacin 25 milliGRAM(s) Oral every 6 hours  lactated ringers. 1000 milliLiter(s) (125 mL/Hr) IV Continuous <Continuous>  prenatal multivitamin 1 Tablet(s) Oral daily     R3 Antepartum Note, HD#3    Interval events:    Patient seen and examined at bedside. S/p amnioreduction 3/5. No evidence of infected amniotic fluid on prelim results. Pt c/o feeling increased "wetness." Pt denies, VB, ctx, HA, epigastric pain, blurred vision, CP, SOB, N/V, fevers, and chills.    Vital Signs Last 24 Hours  T(C): 36.5 (03-06-23 @ 01:27), Max: 37.0 (03-05-23 @ 16:54)  HR: 77 (03-06-23 @ 01:27) (71 - 86)  BP: 98/55 (03-06-23 @ 01:27) (98/55 - 121/64)  RR: 16 (03-05-23 @ 18:00) (16 - 18)  SpO2: --    Physical Exam:  General: NAD  Abdomen: Soft, non-tender, gravid  Ext: No pain or swelling    Labs:             11.8   11.58 )-----------( 339      ( 03-04 @ 19:09 )             36.8         MEDICATIONS  (STANDING):  famotidine    Tablet 20 milliGRAM(s) Oral two times a day  indomethacin 25 milliGRAM(s) Oral every 6 hours  lactated ringers. 1000 milliLiter(s) (125 mL/Hr) IV Continuous <Continuous>  prenatal multivitamin 1 Tablet(s) Oral daily

## 2023-03-06 NOTE — OB RN INTRAOPERATIVE NOTE - NS_ADDITIONALPROCINFO2_OBGYN_ALL_OB_FT
post spinal pre procedure, breech presentation, posterior placenta  cervical length 2.4   post procedure, cervix closed after procedure

## 2023-03-06 NOTE — OB RN INTRAOPERATIVE NOTE - NSOBSELHIDDEN_OBGYN_ALL_OB_FT
[NSOBAttendingProcedure1_OBGYN_ALL_OB_FT:[id number]=],[NSRNCirculatorProcedure1_OBGYN_ALL_OB_FT:[id number]=]

## 2023-03-06 NOTE — PROVIDER CONTACT NOTE (OTHER) - SITUATION
s/p cerclage placement on 3/6  pt states she feels a little dizzy and light headed. pt feeling anxious

## 2023-03-06 NOTE — PROCEDURAL SAFETY CHECKLIST WITH OR WITHOUT SEDATION - NSPOSTCOMMENTFT_GEN_ALL_CORE
post spinal prior to procedure  posterior placenta  breech presentation confirmed  post spinal prior to procedure  posterior placenta  breech presentation confirmed  cervical length 2.4  FHR post procedure 131  post spinal pre procedure, breech presentation, posterior placenta  cervical length 2.4   post procedure, cervix closed after procedure

## 2023-03-06 NOTE — PROGRESS NOTE ADULT - ASSESSMENT
28yo  at 21w2d a/w cervical dilation and vaginal pressure (SSE cervix visually appears 2cm). Patient s/p amnioreduction 3/5. Pt stable overnight.     #cervical insufficiency  -SSE 2cm dilated w/ bulging membranes  -s/p amnioreduction 3/5  -f/u final amniocentesis results  -plan for rescue cerclage placement this AM    #Fetal wellbeing  - FH Check daily     #Maternal wellbeing  - continue close observation   - NPO for procedure  - SCDs for DVT ppx  - PNV/Iron/Colace/Folic acid  - f/u UCx    Festus, PGY3   26yo  at 21w2d a/w cervical dilation and vaginal pressure (SSE cervix visually appears 2cm). Patient s/p amnioreduction 3/5. Pt stable overnight.     #cervical insufficiency  -SSE 2cm dilated w/ bulging membranes  -s/p amnioreduction 3/5  -f/u final amniocentesis results  -plan for rescue cerclage placement this AM    #Fetal wellbeing  - FH Check daily     #Maternal wellbeing  - continue close observation   - NPO for procedure  - SCDs for DVT ppx  - PNV/Iron/Colace/Folic acid  - f/u UCx    Festus, PGY3      KHURRAM FELLOW ADDENDUM:   26 yo  at 21w2d admitted for painless cervical dilation. Patient s/p amniocentesis with amnioreduction with results reassuring. AmniSure negative, and no evidence of infection at this time. Patient now s/p an exam indicated cerclage this morning. Patient and fetus tolerated procedure well. CL distal to cerclage at approx 2.4cm post procedure. Will continue Indocin x 48 hours, and for Ancef and Flagyl x24 hours post cerclage. For further observation for now, with plan for discharge tomorrow should patient remain stable.   KHURRAM Blevins Fellow   Seen and d/w KHURRAM Camargo Attending

## 2023-03-06 NOTE — BRIEF OPERATIVE NOTE - OPERATION/FINDINGS
at 21w2d with exam at 2-3 cm dilated. Amniocentesis/ Amnioreduction done prior to procedure with no evidence of infection. AmniSure also done and negative. A modified Shirodkar cerclage was placed using Mersiline suture, tied at the 12 o clock position with 8 knots, tagged with Proline suture. Anterior dissection performed and hemostatic with no need for closure. Patient given Ancef and Flagyl prior to procedure. Fetal movement noted post procedure and fetal heart rate 131 bpm. Cervix closed with approx 2.4 cm distal to the cervix.

## 2023-03-07 ENCOUNTER — TRANSCRIPTION ENCOUNTER (OUTPATIENT)
Age: 28
End: 2023-03-07

## 2023-03-07 VITALS
DIASTOLIC BLOOD PRESSURE: 67 MMHG | HEART RATE: 77 BPM | SYSTOLIC BLOOD PRESSURE: 118 MMHG | RESPIRATION RATE: 18 BRPM | OXYGEN SATURATION: 98 % | TEMPERATURE: 98 F

## 2023-03-07 PROBLEM — Z87.42 PERSONAL HISTORY OF OTHER DISEASES OF THE FEMALE GENITAL TRACT: Chronic | Status: ACTIVE | Noted: 2023-03-04

## 2023-03-07 LAB
APPEARANCE UR: ABNORMAL
BACTERIA # UR AUTO: ABNORMAL
BILIRUB UR-MCNC: NEGATIVE — SIGNIFICANT CHANGE UP
BLD GP AB SCN SERPL QL: NEGATIVE — SIGNIFICANT CHANGE UP
COLOR SPEC: YELLOW — SIGNIFICANT CHANGE UP
DIFF PNL FLD: ABNORMAL
EPI CELLS # UR: 7 /HPF — HIGH (ref 0–5)
GLUCOSE UR QL: NEGATIVE — SIGNIFICANT CHANGE UP
HCT VFR BLD CALC: 31.5 % — LOW (ref 34.5–45)
HGB BLD-MCNC: 10.4 G/DL — LOW (ref 11.5–15.5)
HYALINE CASTS # UR AUTO: 4 /LPF — SIGNIFICANT CHANGE UP (ref 0–7)
KETONES UR-MCNC: NEGATIVE — SIGNIFICANT CHANGE UP
LEUKOCYTE ESTERASE UR-ACNC: ABNORMAL
MCHC RBC-ENTMCNC: 30.3 PG — SIGNIFICANT CHANGE UP (ref 27–34)
MCHC RBC-ENTMCNC: 33 GM/DL — SIGNIFICANT CHANGE UP (ref 32–36)
MCV RBC AUTO: 91.8 FL — SIGNIFICANT CHANGE UP (ref 80–100)
NITRITE UR-MCNC: NEGATIVE — SIGNIFICANT CHANGE UP
NRBC # BLD: 0 /100 WBCS — SIGNIFICANT CHANGE UP (ref 0–0)
NRBC # FLD: 0 K/UL — SIGNIFICANT CHANGE UP (ref 0–0)
PH UR: 8 — SIGNIFICANT CHANGE UP (ref 5–8)
PLATELET # BLD AUTO: 304 K/UL — SIGNIFICANT CHANGE UP (ref 150–400)
PROT UR-MCNC: ABNORMAL
RBC # BLD: 3.43 M/UL — LOW (ref 3.8–5.2)
RBC # FLD: 12.4 % — SIGNIFICANT CHANGE UP (ref 10.3–14.5)
RBC CASTS # UR COMP ASSIST: 20 /HPF — HIGH (ref 0–4)
RH IG SCN BLD-IMP: POSITIVE — SIGNIFICANT CHANGE UP
SP GR SPEC: 1.01 — SIGNIFICANT CHANGE UP (ref 1.01–1.05)
UROBILINOGEN FLD QL: SIGNIFICANT CHANGE UP
WBC # BLD: 13.9 K/UL — HIGH (ref 3.8–10.5)
WBC # FLD AUTO: 13.9 K/UL — HIGH (ref 3.8–10.5)
WBC UR QL: 10 /HPF — HIGH (ref 0–5)

## 2023-03-07 RX ORDER — CEPHALEXIN 500 MG
1 CAPSULE ORAL
Qty: 28 | Refills: 0
Start: 2023-03-07 | End: 2023-03-13

## 2023-03-07 RX ORDER — METRONIDAZOLE 500 MG
500 TABLET ORAL ONCE
Refills: 0 | Status: COMPLETED | OUTPATIENT
Start: 2023-03-07 | End: 2023-03-07

## 2023-03-07 RX ORDER — FAMOTIDINE 10 MG/ML
1 INJECTION INTRAVENOUS
Qty: 14 | Refills: 0
Start: 2023-03-07 | End: 2023-03-13

## 2023-03-07 RX ORDER — PROGESTERONE 200 MG/1
0 CAPSULE, LIQUID FILLED ORAL
Qty: 0 | Refills: 0 | DISCHARGE

## 2023-03-07 RX ORDER — INDOMETHACIN 50 MG
1 CAPSULE ORAL
Qty: 4 | Refills: 0
Start: 2023-03-07 | End: 2023-03-07

## 2023-03-07 RX ORDER — FAMOTIDINE 10 MG/ML
1 INJECTION INTRAVENOUS
Qty: 0 | Refills: 0 | DISCHARGE
Start: 2023-03-07

## 2023-03-07 RX ADMIN — Medication 25 MILLIGRAM(S): at 01:55

## 2023-03-07 RX ADMIN — Medication 1 TABLET(S): at 08:31

## 2023-03-07 RX ADMIN — Medication 25 MILLIGRAM(S): at 08:32

## 2023-03-07 RX ADMIN — POLYETHYLENE GLYCOL 3350 17 GRAM(S): 17 POWDER, FOR SOLUTION ORAL at 08:31

## 2023-03-07 RX ADMIN — Medication 500 MILLIGRAM(S): at 09:02

## 2023-03-07 RX ADMIN — Medication 25 MILLIGRAM(S): at 14:24

## 2023-03-07 RX ADMIN — Medication 100 MILLIGRAM(S): at 00:36

## 2023-03-07 NOTE — DISCHARGE NOTE ANTEPARTUM - CARE PLAN
1 Principal Discharge DX:	Cervical insufficiency during pregnancy, antepartum  Assessment and plan of treatment:	-Follow up with OBGYN in office within 2-3 days  -Continue prescribed medications of Indocin and vaginal progesterone  -Return to hospital if you should develop fever greater than 100.4 degree Fahrenheit, leaking of fluid, vaginal bleeding, lower uterine cramping/contractions, decrease or no fetal movement

## 2023-03-07 NOTE — DISCHARGE NOTE ANTEPARTUM - NS MD DC FALL RISK RISK
For information on Fall & Injury Prevention, visit: [email or web address] OR  [email or web address] OR  https://www.cdc.gov/steadi/patient.html

## 2023-03-07 NOTE — DISCHARGE NOTE ANTEPARTUM - CARE PROVIDER_API CALL
Temitope Kumar; MPH)  [facility]  [address], [address]  [place] [address]  Phone: [phone]  Fax: [phone]  Follow Up Time:

## 2023-03-07 NOTE — CHART NOTE - NSCHARTNOTEFT_GEN_A_CORE
Amniocentesis     Prior to amnio, patient complained of lower abdominal cramping. Therefore a SVE was performed and found to be 3/90/-3 which is consistent with prior SSE. Decision made to proceed.     Abdomen was prepped and draped in normal sterile fashion. Using ultrasound guidance, amniocentesis was performed. 150cc of clear amniotic fluid was removed. Patient tolerated the procedure well. +FH noted following.     Fluid to be sent for glucose, cell culture, cell count and gram stain along with genetic testing including FISH, chromosome and microarray.   Carly Hirschberg, MFM Fellow   Procedure performed with KHURRAM Camargo Attending
In room to evaluate patient.  Patient is a P0 at 21+0wga with recent diagnosis of short cervix with funneling (1.5cm.) States she started vaginal progesterone yesterday. Today, she had one episode of vaginal pressure followed by large amount of mucous discharge.  SSE: Cervix 2cm dilated with membranes at external os.  [place] placed to rule out  labor.  CBC to check for leukocytosis.  MFM to assess tomorrow for possible cerclage placement.   All questions answered.
Patient c/o intermittent vaginal pressure and burning while urinating today. Pt to be discharged home s/p Dwight rescue cerclage placement from yesterday. Denies fever, chills, back pain, flank pain, foul smelling discharge, pelvic fullness, n/v.    NAD  VSS, afebrile  back: no CVA tenderness  abdomen: soft nontender    UA ordered. Pending results will order urine culture and possible antibiotics.  D/w Dr. Wharton        UA reveals large leukocytes:  Urinalysis Basic - ( 07 Mar 2023 12:48 )    Color: Yellow / Appearance: Slightly Turbid / S.011 / pH: x  Gluc: x / Ketone: Negative  / Bili: Negative / Urobili: <2 mg/dL   Blood: x / Protein: Trace / Nitrite: Negative   Leuk Esterase: Large / RBC: 20 /HPF / WBC 10 /HPF   Sq Epi: x / Non Sq Epi: 7 /HPF / Bacteria: Few    Urine culture obtained and sent. Keflex 500mg QID antibiotic sent to pt's preferred pharmacy for empiric coverage for UTI.  Pt informed of results. All questions answered  Tylenol PRN for pain control  D/w Dr. Akiko Tabor [facility]

## 2023-03-07 NOTE — PROGRESS NOTE ADULT - SUBJECTIVE AND OBJECTIVE BOX
R3 Antepartum Note, HD#4    Patient seen and examined at bedside, no acute overnight events. No acute complaints. Pt reports +FM, denies LOF, VB, ctx, HA, epigastric pain, blurred vision, CP, SOB, N/V, fevers, and chills.    Vital Signs Last 24 Hours  T(C): 36.8 (03-07-23 @ 13:28), Max: 36.8 (03-06-23 @ 14:41)  HR: 75 (03-07-23 @ 13:28) (68 - 88)  BP: 118/67 (03-07-23 @ 13:28) (91/52 - 120/73)  RR: 18 (03-07-23 @ 13:28) (16 - 20)  SpO2: 98% (03-07-23 @ 13:28) (95% - 99%)    CAPILLARY BLOOD GLUCOSE      Physical Exam:  General: NAD  Abdomen: Soft, non-tender, gravid  Ext: No pain or swelling    Labs:             10.4   13.90 )-----------( 304      ( 03-07 @ 05:45 )             31.5       MEDICATIONS  (STANDING):  famotidine    Tablet 20 milliGRAM(s) Oral two times a day  indomethacin 25 milliGRAM(s) Oral every 6 hours  polyethylene glycol 3350 17 Gram(s) Oral daily  prenatal multivitamin 1 Tablet(s) Oral daily

## 2023-03-07 NOTE — PROGRESS NOTE ADULT - ASSESSMENT
28yo  at 21w3d a/w cervical dilation and vaginal pressure (SSE cervix visually appears 2cm). Patient s/p amnioreduction 3/5 and Shirodkar cerclage 3/6. Pt stable overnight.     #cervical insufficiency  -SSE 2cm dilated w/ bulging membranes  -s/p amnioreduction 3/5 and cerclage 3/6  -f/u final amniocentesis results    #Fetal wellbeing  - FH Check daily     #Maternal wellbeing  - continue close observation   - continue regular diet  - SCDs for DVT ppx  - PNV/Iron/Colace/Folic acid  - f/u UCx    Ayse Tartheresa PGY3

## 2023-03-07 NOTE — DISCHARGE NOTE ANTEPARTUM - REASON FOR ADMISSION
Discharge Summary    Patient Sapna Prescott  [id number]  1941  80 y.o. Admit date: 11/16/2022    Discharge date:11/19/2022    Admitting Physician: Mari Smith MD    DATE OF SURGERY: 11/16/2022    SURGEON: Mari Smith MD     PREOP DIAGNOSIS:      1. Lumbar spondylolisthesis   2. Lumbar stenosis     POSTOP DIAGNOSIS:      1. Lumbar spondylolisthesis   2. Lumbar stenosis     PROCEDURE:  1. Posterolateral and transforaminal lumbar interbody fusion L3-L4 and L4-L5 including laminectomy at L3, L4, and L5 for stenosis (CPT 79581, 86715, 71689, 51365 X 2)  2. Insertion biomechanical device L3-L4 and L4-L5 (CPT 22853 X 2)  3. Lumbar laminectomy L1 and L2  with foraminotomies. (CPT B9296787, [id number])  4. Pedicle screw instrumentation  L2 through L5 . (CPT 53820)  5. Allograft (CPT 19571)  6. Posterolateral fusion L2-3 without interbody fusion (CPT 73254)     ANESTHESIA: General    ESTIMATED BLOOD LOSS:  800 ml    INTRAOPERATIVE COMPLICATIONS: None. POSTOP CONDITION: Stable. IMPLANTS:   Implant Name Type Inv.  Item Serial No.  Lot No. LRB No. Used Action   ALLOGRAFT BNE CHIP 1-4 MM 15 CC CRUSH [facility] - [id number]   ALLOGRAFT BNE CHIP 1-4 MM 15 CC CRUSH [facility] [id number] [employer] [facility]   N/A 1 Implanted   GRAFT BNE XL - [id number]   GRAFT BNE XL [id number] [employer]OLOG[employer]   N/A 1 Implanted   SERVICE FEE 10ML BIO DBM PTTY W CHIP - [id number]   SERVICE FEE 10ML BIO DBM PTTY W CHIP   [employer] [facility] [id number] N/A 1 Implanted   SERVICE FEE 10ML BIO DBM PTTY W CHIP - [id number]   SERVICE FEE 10ML BIO DBM PTTY W CHIP   [employer] [facility] [id number] N/A 1 Implanted   BLOCKER SPNL CT SAM 45 - [id number]   BLOCKER SPNL CT SAM 45   YANN SPINE [facility] [id number] N/A 8 Implanted   SCREW SPNL L30MM OD55MM POLYAX GABRIELA CT SAM - [id number]   SCREW SPNL L30MM OD55MM POLYAX GABRIELA CT [place] SPINE HOW- [id number] N/A 2 Implanted   RADHA SPINE PREBENT W HEX VITALIUM [id number] Pooja Drier [id number]   RADHA SPINE PREBENT W HEX VITALIUM [id number] SAM   YANN SPINE HOWM-WD [id number] N/A 2 Implanted   SCREW SPNL L25MM OD55MM POLYAX GABRIELA CT SAM - [id number]   SCREW SPNL L25MM OD55MM POLYAX GABRIELA CT SAM   YANN SPINE HOWM-WD [id number] N/A 2 Implanted   SCREW SPNL L25MM OD65MM POLYAX GABRIELA CT SAM - [id number]   SCREW SPNL L25MM OD65MM POLYAX GABRIELA CT SAM   YANN SPINE HOW-WD [id number] N/A 1 Implanted   SCREW SPNL L50MM OD65MM POLYAX GABRIELA CT SAM - [id number]   SCREW SPNL L50MM OD65MM POLYAX GABRIELA CT SAM   YANN SPINE HOW-WD [id number] N/A 3 Implanted   SPACER SPNL 15 DEG SM 28X10 MM STRL PROLIFT - [id number]   SPACER SPNL 15 DEG SM 28X10 MM STRL PROLIFT   YANN SPINE HOW-WD [id number] N/A 2 Implanted       INDICATIONS FOR PROCEDURE: Patient has had low back pain with radiation to the buttocks and lower extremities for an extended period of time. The symptoms and exam findings were felt to be consistent with neurogenic claudication. The preoperative radiographs and other imaging confirmed showed spondylolisthesis and stenosis. Conservative measures have been exhausted as outlined in the H&P. The symptoms progressed to the point where there is difficulty performing any task that requires prolonged standing or walking which interfered with activities of daily living and ability to enjoy life. In the outpatient setting the risks, benefits and potential complications of the above-listed procedure were discussed with her and an informed consent was obtained. Post Op complications: none    Hospital Course: Patient admitted to ortho floor. Antibiotics were given postop. SCD and shavon hose were in place for DVT prophylaxis. Patient voided normally. Patient did not receive blood transfusion. Patient tolerated pain medications and po diet. The dressing remained clean, dry, and intact.   Physical Therapy started on the day following surgery and progressed to ambulation without assistance. At the time of discharge, had understanding of precautions needed following surgery. Postoperative complications requiring an extended length of stay: None    Discharged to: Home       Medication List        CONTINUE taking these medications      acetaminophen 500 MG tablet  Commonly known as: TYLENOL     citalopram 20 MG tablet  Commonly known as: CELEXA     CITRUCEL PO     Cyanocobalamin 1000 MCG Subl     famotidine 20 MG tablet  Commonly known as: PEPCID     ibuprofen 200 MG tablet  Commonly known as: ADVIL;MOTRIN     ketoconazole 2 % cream  Commonly known as: NIZORAL     levothyroxine 50 MCG tablet  Commonly known as: SYNTHROID     lisinopril 20 MG tablet  Commonly known as: PRINIVIL;ZESTRIL     lovastatin 10 MG tablet  Commonly known as: MEVACOR     mometasone 0.1 % cream  Commonly known as: ELOCON     pantoprazole 40 MG tablet  Commonly known as: PROTONIX     polyethylene glycol 17 GM/SCOOP powder  Commonly known as: GLYCOLAX     PROBIOTIC DAILY PO     tamoxifen 20 MG tablet  Commonly known as: NOLVADEX     Vitamin D3 50 MCG (2000 UT) Caps            ASK your doctor about these medications      oxyCODONE-acetaminophen 5-325 MG per tablet  Commonly known as: Percocet  Take 1 tablet by mouth every 6 hours as needed for Pain for up to 7 days. Intended supply: 7 days. Take lowest dose possible to manage pain  Ask about: Should I take this medication? Where to Get Your Medications        These medications were sent to [facility]  [address] [place] [address]      Phone: [phone]   oxyCODONE-acetaminophen 5-325 MG per tablet         Discharge instructions:  -Resume pre hospital diet            -Resume home medications per medical continuation form     -Follow up in office as scheduled   -Call doctor immediately if T>100.5, increased pain, swelling, drainage.   -If shortness of breath or chest pain, immediately go to ER  -Post surgical instruction sheet given to patient    Signed:  Cathy Spencer MD  11/28/2022 cervical insufficiency/dilation, for cerclage placement

## 2023-03-07 NOTE — DISCHARGE NOTE ANTEPARTUM - PATIENT PORTAL LINK FT
You can access the FollowMyHealth Patient Portal offered by [facility] by registering at the following website: [email or web address]. By joining [employer] FollowMyHealth portal, you will also be able to view your health information using other applications (apps) compatible with our system.

## 2023-03-07 NOTE — DISCHARGE NOTE ANTEPARTUM - EAT A WELL BALANCED DIET INCLUDING PROTEINS (LEAN MEATS, POULTRY, FISH AND BEANS), FRESH FRUIT OR JUICE, FRESH VEGETABLES, AND DAIRY PRODUCTS
ideal/Ht 157cm, current weight 65 kg with 1+edema left leg, 2+edema right foot and left foot and right leg. # +_10% Statement Selected

## 2023-03-07 NOTE — DISCHARGE NOTE ANTEPARTUM - MEDICATION SUMMARY - MEDICATIONS TO TAKE
I will START or STAY ON the medications listed below when I get home from the hospital:    indomethacin 25 mg oral capsule  -- 1 cap(s) by mouth every 6 hours  -- Indication: For s/p cerclage    famotidine 20 mg oral tablet  -- 1 tab(s) by mouth 2 times a day  -- Indication: For while on indocin    PNV Prenatal oral tablet  -- 1 tab(s) by mouth once a day  -- Indication: For pregnancy    progesterone  -- 200 milligram(s) vaginally once a day (at bedtime)  Hold dose until OB clearance  Resume when advised by OBGYN  -- Indication: For short cervix   I will START or STAY ON the medications listed below when I get home from the hospital:    indomethacin 25 mg oral capsule  -- 1 cap(s) by mouth every 6 hours  -- Indication: For s/p cerclage    cephalexin 500 mg oral tablet  -- 1 tab(s) by mouth every 6 hours   -- Finish all this medication unless otherwise directed by prescriber.    -- Indication: For UA +    famotidine 20 mg oral tablet  -- 1 tab(s) by mouth 2 times a day  -- Indication: For while on indocin    PNV Prenatal oral tablet  -- 1 tab(s) by mouth once a day  -- Indication: For pregnancy    progesterone  -- 200 milligram(s) vaginally once a day (at bedtime)  Hold dose until OB clearance  Resume when advised by OBGYN  -- Indication: For short cervix

## 2023-03-07 NOTE — DISCHARGE NOTE ANTEPARTUM - PLAN OF CARE
-Follow up with OBGYN in office within 2-3 days  -Continue prescribed medications of Indocin and vaginal progesterone  -Return to hospital if you should develop fever greater than 100.4 degree Fahrenheit, leaking of fluid, vaginal bleeding, lower uterine cramping/contractions, decrease or no fetal movement

## 2023-03-07 NOTE — DISCHARGE NOTE ANTEPARTUM - HOSPITAL COURSE
26 y/o  admitted to antepartum service at 21 weeks gestation with cervical incompetence of 1.5cm w/ funneling, lower abdominal pressure and increase in vaginal discharge. MFM consulted for possible rescue cerclage. Abdomen soft, nontender. SSE performed by MD, cervix approx 2cm dilated. No ctx noted on toco. TAS in triage: +FH 149bpm, MVP 4.2, posterior placenta. Repeat SSE performed the next hospital day, and found to be approx 3cm dilated with membranes bulging pass the external os. SVE confirmed cervical dilation of 3/90/-3. Amniocentesis/Amnioreduction performed, low suspicion for infection. No growth to date. Rescue Shirodkhar cerclage place. Continue Indocin x48 hours, and for Ancef and Flagyl x24 hours post cerclage placement.

## 2023-03-08 LAB
CULTURE RESULTS: NO GROWTH — SIGNIFICANT CHANGE UP
SPECIMEN SOURCE: SIGNIFICANT CHANGE UP
SUBTELOMERE ANALYSIS BLD/T FISH-IMP: SIGNIFICANT CHANGE UP

## 2023-03-10 LAB
AFP ADJ MOM SERPL: 0.68 — SIGNIFICANT CHANGE UP
AFP INTERP AMN-IMP: SIGNIFICANT CHANGE UP
AFP SERPL-MCNC: 3.8 UG/ML — SIGNIFICANT CHANGE UP
ALPHA FETOPROTEIN, AMNIOTIC FLUID RESULT: SIGNIFICANT CHANGE UP
CULTURE RESULTS: SIGNIFICANT CHANGE UP
GA (WEEKS): 21 — SIGNIFICANT CHANGE UP
LAB DIRECTOR NAME PROVIDER: SIGNIFICANT CHANGE UP
SPECIMEN SOURCE: SIGNIFICANT CHANGE UP

## 2023-03-13 ENCOUNTER — APPOINTMENT (OUTPATIENT)
Dept: ANTEPARTUM | Facility: CLINIC | Age: 28
End: 2023-03-13
Payer: MEDICAID

## 2023-03-13 ENCOUNTER — APPOINTMENT (OUTPATIENT)
Dept: OBGYN | Facility: CLINIC | Age: 28
End: 2023-03-13
Payer: MEDICAID

## 2023-03-13 VITALS — DIASTOLIC BLOOD PRESSURE: 84 MMHG | SYSTOLIC BLOOD PRESSURE: 130 MMHG | BODY MASS INDEX: 29.29 KG/M2 | WEIGHT: 155 LBS

## 2023-03-13 PROCEDURE — 99213 OFFICE O/P EST LOW 20 MIN: CPT

## 2023-03-13 PROCEDURE — 76817 TRANSVAGINAL US OBSTETRIC: CPT

## 2023-03-13 PROCEDURE — 76816 OB US FOLLOW-UP PER FETUS: CPT

## 2023-03-13 NOTE — HISTORY OF PRESENT ILLNESS
[FreeTextEntry1] : 27 year old female presenting for cervical length. She is a Pt of . She had cerclage placed on 3/6/23. Cervical length done today measuring 2.0cm. Reviewed labor precautions. She said she hasn’t started vaginal progesterone yet.

## 2023-03-13 NOTE — DISCUSSION/SUMMARY
[FreeTextEntry1] : -sono done today (see official sono report) \par -pt to start vaginal progesterone 200mg \par -RTO 1 week for cervical length

## 2023-03-15 LAB — PRENATAL CHROMO MICROARRAY: NEGATIVE — SIGNIFICANT CHANGE UP

## 2023-03-17 LAB
CHROM ANALY OVERALL INTERP SPEC-IMP: SIGNIFICANT CHANGE UP
NYS PRENATAL DIAGNOSIS FOLLOW-UP QUESTIONNAIRE: SIGNIFICANT CHANGE UP

## 2023-03-20 ENCOUNTER — APPOINTMENT (OUTPATIENT)
Dept: ANTEPARTUM | Facility: CLINIC | Age: 28
End: 2023-03-20
Payer: MEDICAID

## 2023-03-20 ENCOUNTER — APPOINTMENT (OUTPATIENT)
Dept: OBGYN | Facility: CLINIC | Age: 28
End: 2023-03-20
Payer: MEDICAID

## 2023-03-20 VITALS — WEIGHT: 159 LBS | DIASTOLIC BLOOD PRESSURE: 78 MMHG | SYSTOLIC BLOOD PRESSURE: 120 MMHG | BODY MASS INDEX: 30.04 KG/M2

## 2023-03-20 PROCEDURE — 76816 OB US FOLLOW-UP PER FETUS: CPT

## 2023-03-20 PROCEDURE — ZZZZZ: CPT

## 2023-03-20 PROCEDURE — 76817 TRANSVAGINAL US OBSTETRIC: CPT

## 2023-03-21 NOTE — DISCUSSION/SUMMARY
[FreeTextEntry1] : -discussed with pt monitoring cervical length isnt needed at this time \par -pt to continue vaginal progesterone \par -f/u PRN

## 2023-03-21 NOTE — HISTORY OF PRESENT ILLNESS
[FreeTextEntry1] : 27 year old female presenting for cervical length. cervical length done today measuring 1.8cm, cerclage in situ. (see official sono report)

## 2023-03-24 ENCOUNTER — NON-APPOINTMENT (OUTPATIENT)
Age: 28
End: 2023-03-24

## 2023-03-24 ENCOUNTER — OUTPATIENT (OUTPATIENT)
Dept: INPATIENT UNIT | Facility: HOSPITAL | Age: 28
LOS: 1 days | Discharge: HOME | End: 2023-03-24
Payer: MEDICAID

## 2023-03-24 ENCOUNTER — ASOB RESULT (OUTPATIENT)
Age: 28
End: 2023-03-24

## 2023-03-24 ENCOUNTER — APPOINTMENT (OUTPATIENT)
Dept: ANTEPARTUM | Facility: CLINIC | Age: 28
End: 2023-03-24
Payer: MEDICAID

## 2023-03-24 VITALS
HEART RATE: 106 BPM | TEMPERATURE: 98 F | DIASTOLIC BLOOD PRESSURE: 77 MMHG | SYSTOLIC BLOOD PRESSURE: 127 MMHG | RESPIRATION RATE: 16 BRPM

## 2023-03-24 VITALS — SYSTOLIC BLOOD PRESSURE: 107 MMHG | HEART RATE: 105 BPM | DIASTOLIC BLOOD PRESSURE: 62 MMHG

## 2023-03-24 DIAGNOSIS — O26.899 OTHER SPECIFIED PREGNANCY RELATED CONDITIONS, UNSPECIFIED TRIMESTER: ICD-10-CM

## 2023-03-24 DIAGNOSIS — O34.30 MATERNAL CARE FOR CERVICAL INCOMPETENCE, UNSPECIFIED TRIMESTER: Chronic | ICD-10-CM

## 2023-03-24 LAB
AMNISURE ROM (RUPTURE OF MEMBRANES): NEGATIVE — SIGNIFICANT CHANGE UP
APPEARANCE UR: ABNORMAL
BACTERIA # UR AUTO: ABNORMAL
BILIRUB UR-MCNC: NEGATIVE — SIGNIFICANT CHANGE UP
COLOR SPEC: SIGNIFICANT CHANGE UP
DIFF PNL FLD: NEGATIVE — SIGNIFICANT CHANGE UP
EPI CELLS # UR: 11 /HPF — HIGH (ref 0–5)
GLUCOSE UR QL: NEGATIVE — SIGNIFICANT CHANGE UP
HYALINE CASTS # UR AUTO: 0 /LPF — SIGNIFICANT CHANGE UP (ref 0–7)
KETONES UR-MCNC: NEGATIVE — SIGNIFICANT CHANGE UP
LEUKOCYTE ESTERASE UR-ACNC: ABNORMAL
NITRITE UR-MCNC: NEGATIVE — SIGNIFICANT CHANGE UP
PH UR: 7 — SIGNIFICANT CHANGE UP (ref 5–8)
PROT UR-MCNC: NEGATIVE — SIGNIFICANT CHANGE UP
RBC CASTS # UR COMP ASSIST: 13 /HPF — HIGH (ref 0–4)
SP GR SPEC: 1.01 — SIGNIFICANT CHANGE UP (ref 1.01–1.05)
UROBILINOGEN FLD QL: SIGNIFICANT CHANGE UP
WBC UR QL: 7 /HPF — HIGH (ref 0–5)

## 2023-03-24 PROCEDURE — 99221 1ST HOSP IP/OBS SF/LOW 40: CPT | Mod: 25

## 2023-03-24 PROCEDURE — 76817 TRANSVAGINAL US OBSTETRIC: CPT | Mod: 26

## 2023-03-24 PROCEDURE — 83986 ASSAY PH BODY FLUID NOS: CPT | Mod: QW

## 2023-03-24 PROCEDURE — 76815 OB US LIMITED FETUS(S): CPT | Mod: 26

## 2023-03-24 NOTE — OB PROVIDER TRIAGE NOTE - NSHPPHYSICALEXAM_GEN_ALL_CORE
Vital Signs Last 24 Hrs  T(C): 36.7 (24 Mar 2023 11:11), Max: 36.7 (24 Mar 2023 11:11)  T(F): 98.1 (24 Mar 2023 11:11), Max: 98.1 (24 Mar 2023 11:11)  HR: 107 (24 Mar 2023 11:55) (106 - 112)  BP: 114/71 (24 Mar 2023 11:55) (114/69 - 127/77)  BP(mean): --  RR: 16 (24 Mar 2023 11:11) (16 - 16)  SpO2: --    abdomen soft, non tender  nst: 145bpm, mod variability, no decels.  toco: no ctx  SSE: progesterone seen in vault. neg pooling, neg nitrazine, neg ferning. cervix appears closed, no bleeding noted. cerclage visualized and intact.  TAS:  TVS: Vital Signs Last 24 Hrs  T(C): 36.7 (24 Mar 2023 11:11), Max: 36.7 (24 Mar 2023 11:11)  T(F): 98.1 (24 Mar 2023 11:11), Max: 98.1 (24 Mar 2023 11:11)  HR: 107 (24 Mar 2023 11:55) (106 - 112)  BP: 114/71 (24 Mar 2023 11:55) (114/69 - 127/77)  BP(mean): --  RR: 16 (24 Mar 2023 11:11) (16 - 16)  SpO2: --    abdomen soft, non tender  nst: 145bpm, mod variability, no decels.  toco: no ctx  SSE: progesterone seen in vault. neg pooling, neg nitrazine, neg ferning. cervix appears closed, no bleeding noted. cerclage visualized and intact.   TAS:  TVS: Vital Signs Last 24 Hrs  T(C): 36.7 (24 Mar 2023 11:11), Max: 36.7 (24 Mar 2023 11:11)  T(F): 98.1 (24 Mar 2023 11:11), Max: 98.1 (24 Mar 2023 11:11)  HR: 107 (24 Mar 2023 11:55) (106 - 112)  BP: 114/71 (24 Mar 2023 11:55) (114/69 - 127/77)  BP(mean): --  RR: 16 (24 Mar 2023 11:11) (16 - 16)  SpO2: --    abdomen soft, non tender  nst: 145bpm, mod variability, no decels.  toco: no ctx  SSE: progesterone seen in vault. neg pooling, neg nitrazine, neg ferning. cervix appears closed, no bleeding noted. cerclage visualized, no tension on stitch  TAS:   TVS: Vital Signs Last 24 Hrs  T(C): 36.7 (24 Mar 2023 11:11), Max: 36.7 (24 Mar 2023 11:11)  T(F): 98.1 (24 Mar 2023 11:11), Max: 98.1 (24 Mar 2023 11:11)  HR: 107 (24 Mar 2023 11:55) (106 - 112)  BP: 114/71 (24 Mar 2023 11:55) (114/69 - 127/77)  BP(mean): --  RR: 16 (24 Mar 2023 11:11) (16 - 16)  SpO2: --    abdomen soft, non tender  nst: 145bpm, mod variability, no decels.  toco: no ctx  SSE: progesterone seen in vault. neg pooling, neg nitrazine, neg ferning. cervix appears closed, no bleeding noted. cerclage visualized, no tension on stitch  TAS: m-mode 149bpm, mvp 3.5cm, vertex presentation, posterior placenta  TVS: CL 1.7-2.0cm, funneling present. Vital Signs Last 24 Hrs  T(C): 36.7 (24 Mar 2023 11:11), Max: 36.7 (24 Mar 2023 11:11)  T(F): 98.1 (24 Mar 2023 11:11), Max: 98.1 (24 Mar 2023 11:11)  HR: 107 (24 Mar 2023 11:55) (106 - 112)  BP: 114/71 (24 Mar 2023 11:55) (114/69 - 127/77)  BP(mean): --  RR: 16 (24 Mar 2023 11:11) (16 - 16)  SpO2: --    abdomen soft, non tender  nst: 145bpm, mod variability, no decels. appropriate for GA  toco: no ctx  SSE: progesterone seen in vault. neg pooling, neg nitrazine, neg ferning. cervix appears closed, no bleeding noted. cerclage visualized, no tension on stitch  TAS: m-mode 149bpm, mvp 3.5cm, vertex presentation, posterior placenta  TVS: CL 1.7-2.0cm, funneling present. cerclage visualized Vital Signs Last 24 Hrs  T(C): 36.7 (24 Mar 2023 11:11), Max: 36.7 (24 Mar 2023 11:11)  T(F): 98.1 (24 Mar 2023 11:11), Max: 98.1 (24 Mar 2023 11:11)  HR: 107 (24 Mar 2023 11:55) (106 - 112)  BP: 114/71 (24 Mar 2023 11:55) (114/69 - 127/77)  BP(mean): --  RR: 16 (24 Mar 2023 11:11) (16 - 16)  SpO2: --    abdomen soft, non tender  nst: 145bpm, mod variability, no decels. appropriate for GA  toco: no ctx  SSE: progesterone seen in vault. neg pooling, neg nitrazine, neg ferning. cervix appears closed, no bleeding noted. cerclage visualized, no tension on stitch  TAS: m-mode 149bpm, mvp 3.5cm, breech presentation, posterior placenta  TVS: CL 1.7-2.0cm, funneling present. cerclage visualized

## 2023-03-24 NOTE — OB PROVIDER TRIAGE NOTE - ADDITIONAL INSTRUCTIONS
- Discussed with Dr. Wharton  - Patient to be discharged home with follow up and return precautions  - Please follow up with your obstetrician at your next scheduled appointment 3/31.  - Please return for decreased/no fetal movement, vaginal bleeding similar to that of a period, leaking/gush of fluid, regular contractions   - Patient and partner and educated of plan and demonstrate understanding. All questions answered. Discharge instructions provided and signed.   -  triage team to f/u with urine culture results within 48 hrs.  - Discharged at 1345

## 2023-03-24 NOTE — OB PROVIDER TRIAGE NOTE - NSOBPROVIDERNOTE_OBGYN_ALL_OB_FT
28 y/o  at 23.6weeks, ruling our rupture of membranes, pre-term labor.    -amnsiure sent  d/w MD Wharton. 28 y/o  at 23.6weeks, ruling out rupture of membranes, pre-term labor.    UA  Amnisure    -amnsiure negative  d/w MD Wharton.    -no evidence of ruptured membranes or pre term labor  -most likely leukkorhea 28 y/o  at 23.6weeks, ruling out rupture of membranes, pre-term labor.    UA/UC pending  Amnisure    -amnsiure negative  d/w MD Wharton.    -Dr. Wharton and Dr. Beaver at bedside, reviewing TVS images.  -CL per Dr. Wharton 2.5cm    -ruled out for uptured membranes, pre term labor  -most likely leukkorhea    - Discussed with Dr. Wharton  - Patient to be discharged home with follow up and return precautions  - Please follow up with your obstetrician at your next scheduled appointment 3/31.  - Please return for decreased/no fetal movement, vaginal bleeding similar to that of a period, leaking/gush of fluid, regular contractions   - Patient and partner and educated of plan and demonstrate understanding. All questions answered. Discharge instructions provided and signed.   -  triage team to f/u with urine culture results within 48 hrs.  - Discharged at 1345 28 y/o  at 23.6weeks, ruling out rupture of membranes, pre-term labor.    UA/UC pending  Amnisure    -amnsiure negative  d/w MD Wharton.    -Dr. Wharton and Dr. Beaver at bedside, reviewing TVS images.  -CL per Dr. Wharton 2.5cm    -no evidence of ruptured membranes or pre term labor  -most likely leukkorhea  -NST appropriate for GA    - Discussed with Dr. Wharton  - Patient to be discharged home with follow up and return precautions  - Please follow up with your obstetrician at your next scheduled appointment 3/31.  - Please return for decreased/no fetal movement, vaginal bleeding similar to that of a period, leaking/gush of fluid, regular contractions   - Patient and partner and educated of plan and demonstrate understanding. All questions answered. Discharge instructions provided and signed.   -  triage team to f/u with urine culture results within 48 hrs.  - Discharged at 1345

## 2023-03-24 NOTE — OB PROVIDER TRIAGE NOTE - NSHPLABSRESULTS_GEN_ALL_CORE
negative amnisure      Urinalysis Basic - ( 24 Mar 2023 11:30 )    Color: Light Yellow / Appearance: Slightly Turbid / S.011 / pH: x  Gluc: x / Ketone: Negative  / Bili: Negative / Urobili: <2 mg/dL   Blood: x / Protein: Negative / Nitrite: Negative   Leuk Esterase: Small / RBC: 13 /HPF / WBC 7 /HPF   Sq Epi: x / Non Sq Epi: 11 /HPF / Bacteria: Few negative amnisure      Urinalysis Basic - ( 24 Mar 2023 11:30 )    Color: Light Yellow / Appearance: Slightly Turbid / S.011 / pH: x  Gluc: x / Ketone: Negative  / Bili: Negative / Urobili: <2 mg/dL   Blood: x / Protein: Negative / Nitrite: Negative   Leuk Esterase: Small / RBC: 13 /HPF / WBC 7 /HPF   Sq Epi: x / Non Sq Epi: 11 /HPF / Bacteria: Few    UC pending

## 2023-03-24 NOTE — OB PROVIDER TRIAGE NOTE - HISTORY OF PRESENT ILLNESS
PNC: Dr. Moralez    26y/o  at 23.6weeks presents with c/o clear discharge since 4am. Pt denies pain. Pt denies cramping, vb, and reports positive fm.  Pt denies fever, chills, nausea.    AP Course complicated by:  -shortened cervix; shirodkar cerclage placed 3/6/23. last CL 1.8cm on 3/21 via report. pt started nightly vaginal progesterone on 3/13. last took last nite 3/23.  -hx PVC's. pt saw cardiologist in 2017, echo and ekg WNL. pt was advised to f/u was cards during pregnancy by OB. but has yet to do so.

## 2023-03-26 LAB
CULTURE RESULTS: SIGNIFICANT CHANGE UP
SPECIMEN SOURCE: SIGNIFICANT CHANGE UP

## 2023-03-27 DIAGNOSIS — Z86.79 PERSONAL HISTORY OF OTHER DISEASES OF THE CIRCULATORY SYSTEM: ICD-10-CM

## 2023-03-27 DIAGNOSIS — Z3A.23 23 WEEKS GESTATION OF PREGNANCY: ICD-10-CM

## 2023-03-27 DIAGNOSIS — O26.872 CERVICAL SHORTENING, SECOND TRIMESTER: ICD-10-CM

## 2023-03-27 DIAGNOSIS — Z86.39 PERSONAL HISTORY OF OTHER ENDOCRINE, NUTRITIONAL AND METABOLIC DISEASE: ICD-10-CM

## 2023-03-27 DIAGNOSIS — O34.32 MATERNAL CARE FOR CERVICAL INCOMPETENCE, SECOND TRIMESTER: ICD-10-CM

## 2023-03-28 ENCOUNTER — NON-APPOINTMENT (OUTPATIENT)
Age: 28
End: 2023-03-28

## 2023-03-30 LAB — MISCELLANEOUS TEST NAME: SIGNIFICANT CHANGE UP

## 2023-03-31 ENCOUNTER — APPOINTMENT (OUTPATIENT)
Dept: OBGYN | Facility: CLINIC | Age: 28
End: 2023-03-31
Payer: MEDICAID

## 2023-03-31 VITALS — BODY MASS INDEX: 29.67 KG/M2 | SYSTOLIC BLOOD PRESSURE: 122 MMHG | WEIGHT: 157 LBS | DIASTOLIC BLOOD PRESSURE: 75 MMHG

## 2023-03-31 DIAGNOSIS — N92.6 IRREGULAR MENSTRUATION, UNSPECIFIED: ICD-10-CM

## 2023-03-31 PROCEDURE — 99213 OFFICE O/P EST LOW 20 MIN: CPT | Mod: TH

## 2023-04-04 LAB
BASOPHILS # BLD AUTO: 0.03 K/UL
BASOPHILS NFR BLD AUTO: 0.3 %
EOSINOPHIL # BLD AUTO: 0.17 K/UL
EOSINOPHIL NFR BLD AUTO: 1.5 %
GLUCOSE 1H P 50 G GLC PO SERPL-MCNC: 116 MG/DL
HCT VFR BLD CALC: 38.3 %
HGB BLD-MCNC: 11.9 G/DL
IMM GRANULOCYTES NFR BLD AUTO: 0.4 %
LYMPHOCYTES # BLD AUTO: 2.17 K/UL
LYMPHOCYTES NFR BLD AUTO: 19.2 %
MAN DIFF?: NORMAL
MCHC RBC-ENTMCNC: 30.5 PG
MCHC RBC-ENTMCNC: 31.1 GM/DL
MCV RBC AUTO: 98.2 FL
MONOCYTES # BLD AUTO: 0.72 K/UL
MONOCYTES NFR BLD AUTO: 6.4 %
NEUTROPHILS # BLD AUTO: 8.19 K/UL
NEUTROPHILS NFR BLD AUTO: 72.2 %
PLATELET # BLD AUTO: 351 K/UL
RBC # BLD: 3.9 M/UL
RBC # FLD: 13.1 %
WBC # FLD AUTO: 11.33 K/UL

## 2023-04-11 ENCOUNTER — NON-APPOINTMENT (OUTPATIENT)
Age: 28
End: 2023-04-11

## 2023-04-13 ENCOUNTER — APPOINTMENT (OUTPATIENT)
Dept: OBGYN | Facility: CLINIC | Age: 28
End: 2023-04-13
Payer: MEDICAID

## 2023-04-13 VITALS
BODY MASS INDEX: 30.21 KG/M2 | DIASTOLIC BLOOD PRESSURE: 82 MMHG | SYSTOLIC BLOOD PRESSURE: 125 MMHG | WEIGHT: 160 LBS | HEIGHT: 61 IN

## 2023-04-13 PROCEDURE — 99213 OFFICE O/P EST LOW 20 MIN: CPT | Mod: TH

## 2023-04-28 ENCOUNTER — APPOINTMENT (OUTPATIENT)
Dept: ANTEPARTUM | Facility: CLINIC | Age: 28
End: 2023-04-28
Payer: MEDICAID

## 2023-04-28 ENCOUNTER — APPOINTMENT (OUTPATIENT)
Dept: OBGYN | Facility: CLINIC | Age: 28
End: 2023-04-28
Payer: MEDICAID

## 2023-04-28 VITALS
WEIGHT: 163 LBS | BODY MASS INDEX: 30.78 KG/M2 | SYSTOLIC BLOOD PRESSURE: 112 MMHG | DIASTOLIC BLOOD PRESSURE: 76 MMHG | HEIGHT: 61 IN

## 2023-04-28 DIAGNOSIS — Z34.90 ENCOUNTER FOR SUPERVISION OF NORMAL PREGNANCY, UNSPECIFIED, UNSPECIFIED TRIMESTER: ICD-10-CM

## 2023-04-28 PROCEDURE — 76816 OB US FOLLOW-UP PER FETUS: CPT

## 2023-04-28 PROCEDURE — 76819 FETAL BIOPHYS PROFIL W/O NST: CPT

## 2023-04-28 PROCEDURE — 99213 OFFICE O/P EST LOW 20 MIN: CPT | Mod: TH

## 2023-05-04 ENCOUNTER — APPOINTMENT (OUTPATIENT)
Dept: CARDIOLOGY | Facility: CLINIC | Age: 28
End: 2023-05-04
Payer: MEDICAID

## 2023-05-04 PROCEDURE — 93306 TTE W/DOPPLER COMPLETE: CPT

## 2023-05-08 ENCOUNTER — NON-APPOINTMENT (OUTPATIENT)
Age: 28
End: 2023-05-08

## 2023-05-09 ENCOUNTER — APPOINTMENT (OUTPATIENT)
Dept: CARDIOLOGY | Facility: CLINIC | Age: 28
End: 2023-05-09
Payer: MEDICAID

## 2023-05-09 VITALS
SYSTOLIC BLOOD PRESSURE: 116 MMHG | OXYGEN SATURATION: 98 % | WEIGHT: 167 LBS | DIASTOLIC BLOOD PRESSURE: 75 MMHG | HEIGHT: 61 IN | HEART RATE: 103 BPM | BODY MASS INDEX: 31.53 KG/M2

## 2023-05-09 DIAGNOSIS — Z83.3 FAMILY HISTORY OF DIABETES MELLITUS: ICD-10-CM

## 2023-05-09 DIAGNOSIS — R00.2 PALPITATIONS: ICD-10-CM

## 2023-05-09 DIAGNOSIS — Z83.438 FAMILY HISTORY OF OTHER DISORDER OF LIPOPROTEIN METABOLISM AND OTHER LIPIDEMIA: ICD-10-CM

## 2023-05-09 DIAGNOSIS — R06.02 SHORTNESS OF BREATH: ICD-10-CM

## 2023-05-09 PROCEDURE — 93000 ELECTROCARDIOGRAM COMPLETE: CPT

## 2023-05-09 PROCEDURE — 99203 OFFICE O/P NEW LOW 30 MIN: CPT | Mod: 25

## 2023-05-09 NOTE — REVIEW OF SYSTEMS
[SOB] : shortness of breath [Palpitations] : palpitations [Negative] : Heme/Lymph [Dyspnea on exertion] : not dyspnea during exertion [Chest Discomfort] : no chest discomfort

## 2023-05-09 NOTE — PHYSICAL EXAM

## 2023-05-09 NOTE — HISTORY OF PRESENT ILLNESS
[FreeTextEntry1] : Trey is a 27-year-old female 30 weeks gestation first pregnancy with SOB over the last few weeks. No PND or orthopnea. Also had some palpitations at rest or walking.

## 2023-05-09 NOTE — DISCUSSION/SUMMARY
[FreeTextEntry1] : The patient is a 27-year-old female 30 weeks gestation with SOB and palpitations. \par #1 CV- normal ECG and echo \par #2 Palpitations- ectopy that is rare and related to GERD\par #3 Ob- 30 weeks gestation, symptoms most likely related to how she is carrying. Reassurance provided. [EKG obtained to assist in diagnosis and management of assessed problem(s)] : EKG obtained to assist in diagnosis and management of assessed problem(s)

## 2023-05-12 ENCOUNTER — APPOINTMENT (OUTPATIENT)
Dept: OBGYN | Facility: CLINIC | Age: 28
End: 2023-05-12
Payer: MEDICAID

## 2023-05-12 VITALS — DIASTOLIC BLOOD PRESSURE: 82 MMHG | WEIGHT: 166 LBS | BODY MASS INDEX: 31.37 KG/M2 | SYSTOLIC BLOOD PRESSURE: 121 MMHG

## 2023-05-12 PROCEDURE — 99213 OFFICE O/P EST LOW 20 MIN: CPT | Mod: TH

## 2023-05-12 RX ORDER — PROGESTERONE 200 MG/1
200 CAPSULE ORAL DAILY
Qty: 30 | Refills: 2 | Status: ACTIVE | COMMUNITY
Start: 2023-05-12 | End: 1900-01-01

## 2023-05-17 ENCOUNTER — OUTPATIENT (OUTPATIENT)
Dept: OUTPATIENT SERVICES | Facility: HOSPITAL | Age: 28
LOS: 1 days | Discharge: HOME | End: 2023-05-17
Payer: MEDICAID

## 2023-05-17 VITALS — SYSTOLIC BLOOD PRESSURE: 100 MMHG | DIASTOLIC BLOOD PRESSURE: 59 MMHG | HEART RATE: 82 BPM

## 2023-05-17 VITALS — TEMPERATURE: 98 F

## 2023-05-17 DIAGNOSIS — O34.30 MATERNAL CARE FOR CERVICAL INCOMPETENCE, UNSPECIFIED TRIMESTER: Chronic | ICD-10-CM

## 2023-05-17 DIAGNOSIS — O26.899 OTHER SPECIFIED PREGNANCY RELATED CONDITIONS, UNSPECIFIED TRIMESTER: ICD-10-CM

## 2023-05-17 LAB
APPEARANCE UR: ABNORMAL
BACTERIA # UR AUTO: NEGATIVE — SIGNIFICANT CHANGE UP
BILIRUB UR-MCNC: NEGATIVE — SIGNIFICANT CHANGE UP
COLOR SPEC: COLORLESS — SIGNIFICANT CHANGE UP
DIFF PNL FLD: NEGATIVE — SIGNIFICANT CHANGE UP
EPI CELLS # UR: 3 /HPF — SIGNIFICANT CHANGE UP (ref 0–5)
GLUCOSE UR QL: NEGATIVE — SIGNIFICANT CHANGE UP
KETONES UR-MCNC: NEGATIVE — SIGNIFICANT CHANGE UP
LEUKOCYTE ESTERASE UR-ACNC: NEGATIVE — SIGNIFICANT CHANGE UP
NITRITE UR-MCNC: NEGATIVE — SIGNIFICANT CHANGE UP
PH UR: 7 — SIGNIFICANT CHANGE UP (ref 5–8)
PROT UR-MCNC: NEGATIVE — SIGNIFICANT CHANGE UP
RBC CASTS # UR COMP ASSIST: 2 /HPF — SIGNIFICANT CHANGE UP (ref 0–4)
SP GR SPEC: 1 — LOW (ref 1.01–1.05)
UROBILINOGEN FLD QL: SIGNIFICANT CHANGE UP
WBC UR QL: 6 /HPF — HIGH (ref 0–5)

## 2023-05-17 PROCEDURE — 99221 1ST HOSP IP/OBS SF/LOW 40: CPT | Mod: 25

## 2023-05-17 PROCEDURE — 59025 FETAL NON-STRESS TEST: CPT | Mod: 26

## 2023-05-17 RX ORDER — ACETAMINOPHEN 500 MG
1000 TABLET ORAL ONCE
Refills: 0 | Status: COMPLETED | OUTPATIENT
Start: 2023-05-17 | End: 2023-05-17

## 2023-05-17 RX ADMIN — Medication 1000 MILLIGRAM(S): at 04:32

## 2023-05-17 NOTE — OB PROVIDER TRIAGE NOTE - HISTORY OF PRESENT ILLNESS
26 y/o , EDC 7/15, GA 31.4 weeks, presents for sharp pelvic pain, mostly on the left side, which radiates to her left hip, beginning at 22:00 last night, 4/10 in intensity on numeric pain scale. Reports pain is constant, aggravated by movement and alleviated by sitting still. Reports she has been having pelvic pain since 30 weeks of pregnancy and was told by Dr. Moralez that it is normal during pregnancy and to use a pelvic band. Pt reports the pain typically improves with heating pads but it did not improve this time which is why she decided to come in. Denies taking any medication for the pain. Requests tylenol now for pain relief. Denies contractions, cramping, LOF, VB. Reports good FM.     AP Course complicated by:  Shortened cervix; alexanderodkar cerclage placed 3/6/23. CL on 3/24 was 1.7-2.0. Pt started nightly vaginal progesterone on 3/13. Last used last night.    Covid Status: Vaccinated, denies ever having Covid  OBGYN History:  -h/o PCOS, denies seeing endocrinologist or taking medications  -denies history of fibroids, abnormal paps, STD's, herpes  Medical History: Denies  Surgical History: Cerclage placement   Allergies: NKDA  Medications: PNV, vaginal progesterone 200 mg daily  Mental Health History: Denies  Alcohol/Drug/Tobacco Use: Denies

## 2023-05-17 NOTE — OB PROVIDER TRIAGE NOTE - NSHPPHYSICALEXAM_GEN_ALL_CORE
Vital Signs Last 24 Hrs  T(C): 36.6 (17 May 2023 03:25), Max: 36.6 (17 May 2023 03:07)  T(F): 97.9 (17 May 2023 03:25), Max: 97.9 (17 May 2023 03:25)  HR: 82 (17 May 2023 03:25) (82 - 82)  BP: 114/72 (17 May 2023 03:25) (114/72 - 114/72)  BP(mean): --  RR: 14 (17 May 2023 03:25) (14 - 14)  SpO2: --    Parameters below as of 17 May 2023 03:25  Patient On (Oxygen Delivery Method): room air    Gen: A/O x3  Abd: Gravid uterus, toco in place, nontender to palpation in all 4 quadrants, nontender to palpation in pelvic area  SSE: os appears closed, tightening from cerclage visualized, leukorrhea and vaginal progesterone seen in vault, no bleeding or leaking of fluid visualized, FFN collected and held  TAS: vertex, posterior placenta,  bpm , ALIYAH 14.56  TVUS: CL 1.84-2.31 , funneling present 2.01 x 2.59  FHR: 130 baseline, moderate variability, with accelerations, no decelerations, reactive  CTX: uterine irritability  SVE: 0/0/-3, cerclage palpated

## 2023-05-17 NOTE — OB RN TRIAGE NOTE - FALL HARM RISK - UNIVERSAL INTERVENTIONS
Bed in lowest position, wheels locked, appropriate side rails in place/Call bell, personal items and telephone in reach/Instruct patient to call for assistance before getting out of bed or chair/Non-slip footwear when patient is out of bed/Levasy to call system/Physically safe environment - no spills, clutter or unnecessary equipment/Purposeful Proactive Rounding/Room/bathroom lighting operational, light cord in reach

## 2023-05-17 NOTE — OB PROVIDER TRIAGE NOTE - NSHPLABSRESULTS_GEN_ALL_CORE
Urinalysis Basic - ( 17 May 2023 03:10 )    Color: Colorless / Appearance: Slightly Turbid / S.005 / pH: x  Gluc: x / Ketone: Negative  / Bili: Negative / Urobili: <2 mg/dL   Blood: x / Protein: Negative / Nitrite: Negative   Leuk Esterase: Negative / RBC: 2 /HPF / WBC 6 /HPF   Sq Epi: x / Non Sq Epi: x / Bacteria: Negative

## 2023-05-17 NOTE — OB PROVIDER TRIAGE NOTE - NSOBPROVIDERNOTE_OBGYN_ALL_OB_FT
28 y/o , EDC 7/15, GA 31.4 weeks, 26 y/o , EDC 7/15, GA 31.4 weeks, no evidence of  labor.  -UA showed few WBC's- urine culture sent  5:00- pt reports pain improved with tylenol, now back to baseline   - Patient to be discharged home with follow up and return precautions  - Please follow up with your obstetrician at your next scheduled appointment.   - Please return for decreased / no fetal movement, vaginal bleeding similar to that of a period, leaking / gush of fluid, regular contractions occurring 4-5 minutes  for one hour or requiring pain medication   - Patient educated of plan and demonstrates understanding. All questions answered. Discharge instructions provided and signed.     Plan d/w Dr. Moralez    Discharge Time: 5:10

## 2023-05-17 NOTE — OB PROVIDER TRIAGE NOTE - PLAN OF CARE
26 y/o , EDC 7/15, GA 31.4 weeks, no evidence of  labor.  -UA showed few WBC's- urine culture sent  5:00- pt reports pain improved with tylenol, now back to baseline   - Patient to be discharged home with follow up and return precautions  - Please follow up with your obstetrician at your next scheduled appointment.   - Please return for decreased / no fetal movement, vaginal bleeding similar to that of a period, leaking / gush of fluid, regular contractions occurring 4-5 minutes  for one hour or requiring pain medication   - Patient educated of plan and demonstrates understanding. All questions answered. Discharge instructions provided and signed.     Plan d/w Dr. Moralez    Discharge Time: 5:10

## 2023-05-17 NOTE — OB PROVIDER TRIAGE NOTE - ADDITIONAL INSTRUCTIONS
28 y/o , EDC 7/15, GA 31.4 weeks, no evidence of  labor.  -UA showed few WBC's- urine culture sent  5:00- pt reports pain improved with tylenol, now back to baseline   - Patient to be discharged home with follow up and return precautions  - Please follow up with your obstetrician at your next scheduled appointment.   - Please return for decreased / no fetal movement, vaginal bleeding similar to that of a period, leaking / gush of fluid, regular contractions occurring 4-5 minutes  for one hour or requiring pain medication   - Patient educated of plan and demonstrates understanding. All questions answered. Discharge instructions provided and signed.     Plan d/w Dr. Moralez    Discharge Time: 5:10

## 2023-05-17 NOTE — OB PROVIDER TRIAGE NOTE - NS_OBGYNHISTORY_OBGYN_ALL_OB_FT
-h/o PCOS, denies seeing endocrinologist or taking medications  -denies history of fibroids, abnormal paps, STD's, herpes

## 2023-05-18 DIAGNOSIS — O99.283 ENDOCRINE, NUTRITIONAL AND METABOLIC DISEASES COMPLICATING PREGNANCY, THIRD TRIMESTER: ICD-10-CM

## 2023-05-18 DIAGNOSIS — O34.33 MATERNAL CARE FOR CERVICAL INCOMPETENCE, THIRD TRIMESTER: ICD-10-CM

## 2023-05-18 DIAGNOSIS — E28.2 POLYCYSTIC OVARIAN SYNDROME: ICD-10-CM

## 2023-05-18 DIAGNOSIS — Z3A.31 31 WEEKS GESTATION OF PREGNANCY: ICD-10-CM

## 2023-05-18 DIAGNOSIS — O26.873 CERVICAL SHORTENING, THIRD TRIMESTER: ICD-10-CM

## 2023-05-18 DIAGNOSIS — O26.893 OTHER SPECIFIED PREGNANCY RELATED CONDITIONS, THIRD TRIMESTER: ICD-10-CM

## 2023-05-18 DIAGNOSIS — R10.2 PELVIC AND PERINEAL PAIN: ICD-10-CM

## 2023-05-18 LAB
CULTURE RESULTS: SIGNIFICANT CHANGE UP
SPECIMEN SOURCE: SIGNIFICANT CHANGE UP

## 2023-05-26 ENCOUNTER — APPOINTMENT (OUTPATIENT)
Dept: OBGYN | Facility: CLINIC | Age: 28
End: 2023-05-26
Payer: MEDICAID

## 2023-05-26 VITALS — BODY MASS INDEX: 31.18 KG/M2 | WEIGHT: 165 LBS | SYSTOLIC BLOOD PRESSURE: 129 MMHG | DIASTOLIC BLOOD PRESSURE: 79 MMHG

## 2023-05-26 PROCEDURE — 99213 OFFICE O/P EST LOW 20 MIN: CPT | Mod: TH

## 2023-06-04 NOTE — OB RN PATIENT PROFILE - WEIGHT IN KG
[facility] - Surgery  Discharge Final Note    Primary Care Provider: Otilio Damon MD    Expected Discharge Date: 6/4/2023    Final Discharge Note (most recent)       Final Note - 06/04/23 1404          Final Note    Assessment Type Final Discharge Note (P)      Anticipated Discharge Disposition Home or Self Care (P)         Post-Acute Status    Discharge Delays None known at this time (P)                      Important Message from Medicare             Contact Info       Dustin Paul MD   Specialty: Orthopedic Surgery    [address]  [place] [address]   Phone: [phone]       Next Steps: Follow up in 2 week(s)    Instructions: For wound re-check          Follow Up Appt  Received: Today  MISAEL Dooley Staff  Caller: Unspecified (Today,  2:07 PM)  Patient discharged over the weekend and needs post-op visit in 2 weeks. Please contact patient directly with appointment details. Thank you!       Matthew La LMSW      [facility]   [phone]       Patient discharged home. In basket message sent to schedule follow up appointment.       Matthew La LMSW   Pediatric/PICU    [facility]  [phone]       71.7

## 2023-06-09 ENCOUNTER — APPOINTMENT (OUTPATIENT)
Dept: OBGYN | Facility: CLINIC | Age: 28
End: 2023-06-09
Payer: MEDICAID

## 2023-06-09 VITALS
HEIGHT: 61 IN | BODY MASS INDEX: 32.1 KG/M2 | DIASTOLIC BLOOD PRESSURE: 74 MMHG | SYSTOLIC BLOOD PRESSURE: 119 MMHG | WEIGHT: 170 LBS

## 2023-06-09 PROCEDURE — 99213 OFFICE O/P EST LOW 20 MIN: CPT | Mod: TH

## 2023-06-13 ENCOUNTER — NON-APPOINTMENT (OUTPATIENT)
Age: 28
End: 2023-06-13

## 2023-06-16 ENCOUNTER — OUTPATIENT (OUTPATIENT)
Dept: OUTPATIENT SERVICES | Facility: HOSPITAL | Age: 28
LOS: 1 days | End: 2023-06-16
Payer: MEDICAID

## 2023-06-16 ENCOUNTER — APPOINTMENT (OUTPATIENT)
Dept: OBGYN | Facility: CLINIC | Age: 28
End: 2023-06-16
Payer: MEDICAID

## 2023-06-16 VITALS
RESPIRATION RATE: 16 BRPM | HEIGHT: 60 IN | SYSTOLIC BLOOD PRESSURE: 118 MMHG | HEART RATE: 91 BPM | WEIGHT: 169.98 LBS | TEMPERATURE: 97 F | DIASTOLIC BLOOD PRESSURE: 82 MMHG | OXYGEN SATURATION: 97 %

## 2023-06-16 VITALS
BODY MASS INDEX: 32.1 KG/M2 | HEIGHT: 61 IN | WEIGHT: 170 LBS | SYSTOLIC BLOOD PRESSURE: 119 MMHG | DIASTOLIC BLOOD PRESSURE: 81 MMHG

## 2023-06-16 DIAGNOSIS — N88.3 INCOMPETENCE OF CERVIX UTERI: ICD-10-CM

## 2023-06-16 DIAGNOSIS — O34.30 MATERNAL CARE FOR CERVICAL INCOMPETENCE, UNSPECIFIED TRIMESTER: Chronic | ICD-10-CM

## 2023-06-16 DIAGNOSIS — Z3A.35 35 WEEKS GESTATION OF PREGNANCY: ICD-10-CM

## 2023-06-16 DIAGNOSIS — I49.1 ATRIAL PREMATURE DEPOLARIZATION: ICD-10-CM

## 2023-06-16 LAB
BLD GP AB SCN SERPL QL: NEGATIVE — SIGNIFICANT CHANGE UP
HCT VFR BLD CALC: 36.9 % — SIGNIFICANT CHANGE UP (ref 34.5–45)
HGB BLD-MCNC: 12 G/DL — SIGNIFICANT CHANGE UP (ref 11.5–15.5)
MCHC RBC-ENTMCNC: 30.7 PG — SIGNIFICANT CHANGE UP (ref 27–34)
MCHC RBC-ENTMCNC: 32.5 GM/DL — SIGNIFICANT CHANGE UP (ref 32–36)
MCV RBC AUTO: 94.4 FL — SIGNIFICANT CHANGE UP (ref 80–100)
NRBC # BLD: 0 /100 WBCS — SIGNIFICANT CHANGE UP (ref 0–0)
NRBC # FLD: 0 K/UL — SIGNIFICANT CHANGE UP (ref 0–0)
PLATELET # BLD AUTO: 306 K/UL — SIGNIFICANT CHANGE UP (ref 150–400)
RBC # BLD: 3.91 M/UL — SIGNIFICANT CHANGE UP (ref 3.8–5.2)
RBC # FLD: 13.2 % — SIGNIFICANT CHANGE UP (ref 10.3–14.5)
RH IG SCN BLD-IMP: POSITIVE — SIGNIFICANT CHANGE UP
WBC # BLD: 9.94 K/UL — SIGNIFICANT CHANGE UP (ref 3.8–10.5)
WBC # FLD AUTO: 9.94 K/UL — SIGNIFICANT CHANGE UP (ref 3.8–10.5)

## 2023-06-16 PROCEDURE — 99213 OFFICE O/P EST LOW 20 MIN: CPT | Mod: TH,25

## 2023-06-16 PROCEDURE — 93010 ELECTROCARDIOGRAM REPORT: CPT

## 2023-06-16 PROCEDURE — 59025 FETAL NON-STRESS TEST: CPT

## 2023-06-16 RX ORDER — PROGESTERONE 200 MG/1
200 CAPSULE, LIQUID FILLED ORAL
Qty: 0 | Refills: 0 | DISCHARGE

## 2023-06-16 NOTE — H&P PST ADULT - PROBLEM SELECTOR PLAN 1
Patient tentatively scheduled for cerclage removal     Pre-op instructions provided. Pt given verbal and written instructions with teach back on pepcid. Pt verbalized understanding with return demonstration. Patient tentatively scheduled for cerclage removal     Pre-op instructions provided. Pt given verbal and written instructions with teach back on Pepcid. Pt verbalized understanding with return demonstration.    copy EKG and ECHO in the chart Patient tentatively scheduled for cerclage removal     Pre-op instructions provided. Pt given verbal and written instructions with teach back on Pepcid. Pt verbalized understanding with return demonstration.

## 2023-06-16 NOTE — H&P PST ADULT - HISTORY OF PRESENT ILLNESS
Lamberto is 27 joel old female 36 week pregnant presents  with proep dx of impotence cervic. patient ad emergent cerclage palcement at 21 weeks. now patiet is sceudled for cerclage removal  Patient is 27 year old female 36 week pregnant presents with pre op dx of impotence cervix. Patient had emergent cerclage placement at 21 weeks. now patient is scheduled for cerclage removal

## 2023-06-16 NOTE — H&P PST ADULT - NECK
normal/supple/symmetric/no tracheal deviation/no thyromegaly/no palpable masses Denies dentures. Denies loose teeth./normal/supple/symmetric/no tracheal deviation/no thyromegaly/no palpable masses

## 2023-06-16 NOTE — H&P PST ADULT - NSANTHTOTALSCORECAL_ENT_A_CORE
[Urgent Visit] : Urgent Visit [FreeTextEntry1] : PT PRESENTS WITH COMPLAINT OF LEFT BREAST PAIN AND QUESTION OF A LUMP 0

## 2023-06-16 NOTE — H&P PST ADULT - NSICDXPASTMEDICALHX_GEN_ALL_CORE_FT
PAST MEDICAL HISTORY:  Cardiac arrhythmia     History of PCOS      PAST MEDICAL HISTORY:  History of PCOS     Incompetent cervix     PAC (premature atrial contraction)

## 2023-06-17 LAB
APPEARANCE: ABNORMAL
BACTERIA: ABNORMAL /HPF
BILIRUBIN URINE: NEGATIVE
BLOOD URINE: NEGATIVE
CAST: 2 /LPF
COLOR: YELLOW
EPITHELIAL CELLS: 6 /HPF
GLUCOSE QUALITATIVE U: NEGATIVE MG/DL
HIV1+2 AB SPEC QL IA.RAPID: NONREACTIVE
KETONES URINE: NEGATIVE MG/DL
LEUKOCYTE ESTERASE URINE: NEGATIVE
MICROSCOPIC-UA: NORMAL
NITRITE URINE: NEGATIVE
PH URINE: 7
PROTEIN URINE: NEGATIVE MG/DL
RED BLOOD CELLS URINE: NORMAL /HPF
REVIEW: NORMAL
SPECIFIC GRAVITY URINE: 1.01
UROBILINOGEN URINE: 0.2 MG/DL
WHITE BLOOD CELLS URINE: 1 /HPF

## 2023-06-20 ENCOUNTER — NON-APPOINTMENT (OUTPATIENT)
Age: 28
End: 2023-06-20

## 2023-06-21 LAB
BACTERIA UR CULT: NORMAL
GP B STREP DNA SPEC QL NAA+PROBE: NOT DETECTED
SOURCE GBS: NORMAL

## 2023-06-22 ENCOUNTER — APPOINTMENT (OUTPATIENT)
Dept: OBGYN | Facility: CLINIC | Age: 28
End: 2023-06-22
Payer: MEDICAID

## 2023-06-22 VITALS
WEIGHT: 170 LBS | HEIGHT: 61 IN | DIASTOLIC BLOOD PRESSURE: 83 MMHG | BODY MASS INDEX: 32.1 KG/M2 | SYSTOLIC BLOOD PRESSURE: 125 MMHG

## 2023-06-22 PROCEDURE — 99214 OFFICE O/P EST MOD 30 MIN: CPT | Mod: TH

## 2023-06-25 ENCOUNTER — TRANSCRIPTION ENCOUNTER (OUTPATIENT)
Age: 28
End: 2023-06-25

## 2023-06-26 ENCOUNTER — TRANSCRIPTION ENCOUNTER (OUTPATIENT)
Age: 28
End: 2023-06-26

## 2023-06-26 ENCOUNTER — INPATIENT (INPATIENT)
Facility: HOSPITAL | Age: 28
LOS: 0 days | Discharge: HOME | End: 2023-06-26
Attending: OBSTETRICS & GYNECOLOGY | Admitting: OBSTETRICS & GYNECOLOGY
Payer: MEDICAID

## 2023-06-26 ENCOUNTER — APPOINTMENT (OUTPATIENT)
Dept: OBGYN | Facility: HOSPITAL | Age: 28
End: 2023-06-26

## 2023-06-26 VITALS — TEMPERATURE: 98 F

## 2023-06-26 VITALS — OXYGEN SATURATION: 97 % | RESPIRATION RATE: 21 BRPM | HEART RATE: 89 BPM

## 2023-06-26 DIAGNOSIS — N88.3 INCOMPETENCE OF CERVIX UTERI: ICD-10-CM

## 2023-06-26 DIAGNOSIS — O34.30 MATERNAL CARE FOR CERVICAL INCOMPETENCE, UNSPECIFIED TRIMESTER: Chronic | ICD-10-CM

## 2023-06-26 PROBLEM — I49.1 ATRIAL PREMATURE DEPOLARIZATION: Chronic | Status: ACTIVE | Noted: 2023-06-16

## 2023-06-26 LAB
BASOPHILS # BLD AUTO: 0.02 K/UL — SIGNIFICANT CHANGE UP (ref 0–0.2)
BASOPHILS NFR BLD AUTO: 0.2 % — SIGNIFICANT CHANGE UP (ref 0–2)
COVID-19 SPIKE DOMAIN AB INTERP: POSITIVE
COVID-19 SPIKE DOMAIN ANTIBODY RESULT: >250 U/ML — HIGH
EOSINOPHIL # BLD AUTO: 0.12 K/UL — SIGNIFICANT CHANGE UP (ref 0–0.5)
EOSINOPHIL NFR BLD AUTO: 1.3 % — SIGNIFICANT CHANGE UP (ref 0–6)
HCT VFR BLD CALC: 38.4 % — SIGNIFICANT CHANGE UP (ref 34.5–45)
HGB BLD-MCNC: 12.6 G/DL — SIGNIFICANT CHANGE UP (ref 11.5–15.5)
IANC: 6.7 K/UL — SIGNIFICANT CHANGE UP (ref 1.8–7.4)
IMM GRANULOCYTES NFR BLD AUTO: 0.6 % — SIGNIFICANT CHANGE UP (ref 0–0.9)
LYMPHOCYTES # BLD AUTO: 1.85 K/UL — SIGNIFICANT CHANGE UP (ref 1–3.3)
LYMPHOCYTES # BLD AUTO: 19.5 % — SIGNIFICANT CHANGE UP (ref 13–44)
MCHC RBC-ENTMCNC: 30.7 PG — SIGNIFICANT CHANGE UP (ref 27–34)
MCHC RBC-ENTMCNC: 32.8 GM/DL — SIGNIFICANT CHANGE UP (ref 32–36)
MCV RBC AUTO: 93.4 FL — SIGNIFICANT CHANGE UP (ref 80–100)
MONOCYTES # BLD AUTO: 0.74 K/UL — SIGNIFICANT CHANGE UP (ref 0–0.9)
MONOCYTES NFR BLD AUTO: 7.8 % — SIGNIFICANT CHANGE UP (ref 2–14)
NEUTROPHILS # BLD AUTO: 6.7 K/UL — SIGNIFICANT CHANGE UP (ref 1.8–7.4)
NEUTROPHILS NFR BLD AUTO: 70.6 % — SIGNIFICANT CHANGE UP (ref 43–77)
NRBC # BLD: 0 /100 WBCS — SIGNIFICANT CHANGE UP (ref 0–0)
NRBC # FLD: 0 K/UL — SIGNIFICANT CHANGE UP (ref 0–0)
PLATELET # BLD AUTO: 258 K/UL — SIGNIFICANT CHANGE UP (ref 150–400)
RBC # BLD: 4.11 M/UL — SIGNIFICANT CHANGE UP (ref 3.8–5.2)
RBC # FLD: 13.3 % — SIGNIFICANT CHANGE UP (ref 10.3–14.5)
RH IG SCN BLD-IMP: POSITIVE — SIGNIFICANT CHANGE UP
SARS-COV-2 IGG+IGM SERPL QL IA: >250 U/ML — HIGH
SARS-COV-2 IGG+IGM SERPL QL IA: POSITIVE
T PALLIDUM AB TITR SER: NEGATIVE — SIGNIFICANT CHANGE UP
WBC # BLD: 9.49 K/UL — SIGNIFICANT CHANGE UP (ref 3.8–10.5)
WBC # FLD AUTO: 9.49 K/UL — SIGNIFICANT CHANGE UP (ref 3.8–10.5)

## 2023-06-26 PROCEDURE — 59871 REMOVE CERCLAGE SUTURE: CPT

## 2023-06-26 RX ORDER — PROGESTERONE 200 MG/1
1 CAPSULE, LIQUID FILLED ORAL
Refills: 0 | DISCHARGE

## 2023-06-26 RX ORDER — SODIUM CHLORIDE 9 MG/ML
1000 INJECTION, SOLUTION INTRAVENOUS ONCE
Refills: 0 | Status: DISCONTINUED | OUTPATIENT
Start: 2023-06-26 | End: 2023-06-26

## 2023-06-26 RX ORDER — OXYTOCIN 10 UNIT/ML
333.33 VIAL (ML) INJECTION
Qty: 20 | Refills: 0 | Status: DISCONTINUED | OUTPATIENT
Start: 2023-06-26 | End: 2023-06-26

## 2023-06-26 RX ORDER — SODIUM CHLORIDE 9 MG/ML
1000 INJECTION, SOLUTION INTRAVENOUS
Refills: 0 | Status: DISCONTINUED | OUTPATIENT
Start: 2023-06-26 | End: 2023-06-26

## 2023-06-26 RX ORDER — CITRIC ACID/SODIUM CITRATE 300-500 MG
30 SOLUTION, ORAL ORAL ONCE
Refills: 0 | Status: COMPLETED | OUTPATIENT
Start: 2023-06-26 | End: 2023-06-26

## 2023-06-26 RX ORDER — ACETAMINOPHEN 500 MG
975 TABLET ORAL ONCE
Refills: 0 | Status: COMPLETED | OUTPATIENT
Start: 2023-06-26 | End: 2023-06-26

## 2023-06-26 RX ORDER — FAMOTIDINE 10 MG/ML
20 INJECTION INTRAVENOUS ONCE
Refills: 0 | Status: COMPLETED | OUTPATIENT
Start: 2023-06-26 | End: 2023-06-26

## 2023-06-26 RX ORDER — ONDANSETRON 8 MG/1
4 TABLET, FILM COATED ORAL ONCE
Refills: 0 | Status: COMPLETED | OUTPATIENT
Start: 2023-06-26 | End: 2023-06-26

## 2023-06-26 RX ADMIN — SODIUM CHLORIDE 125 MILLILITER(S): 9 INJECTION, SOLUTION INTRAVENOUS at 09:07

## 2023-06-26 RX ADMIN — Medication 975 MILLIGRAM(S): at 11:17

## 2023-06-26 RX ADMIN — Medication 30 MILLILITER(S): at 06:33

## 2023-06-26 RX ADMIN — FAMOTIDINE 20 MILLIGRAM(S): 10 INJECTION INTRAVENOUS at 06:33

## 2023-06-26 RX ADMIN — ONDANSETRON 4 MILLIGRAM(S): 8 TABLET, FILM COATED ORAL at 08:36

## 2023-06-26 RX ADMIN — SODIUM CHLORIDE 200 MILLILITER(S): 9 INJECTION, SOLUTION INTRAVENOUS at 06:34

## 2023-06-26 RX ADMIN — Medication 975 MILLIGRAM(S): at 12:00

## 2023-06-26 NOTE — DISCHARGE NOTE ANTEPARTUM - CARE PLAN
Principal Discharge DX:	History of cervical cerclage  Assessment and plan of treatment:	You may have have some vaginal spotting after this procedure - this is normal. Follow up with your doctor as scheduled for routine prenatal care. Return to Labor and Delivery if you experience severely painful and regular contractions, decreased fetal movement, leakage of fluid, or vaginal bleeding.   1

## 2023-06-26 NOTE — BRIEF OPERATIVE NOTE - OPERATION/FINDINGS
Dwight cerclage visualized and removed intact.  VE: 1.5/50/-3 .  NST reassuring at the end of the procedure Dwight cerclage visualized and removed intact.  VE: 1.5/50/-3.  NST reassuring at the end of the procedure

## 2023-06-26 NOTE — DISCHARGE NOTE ANTEPARTUM - PATIENT PORTAL LINK FT
You can access the FollowMyHealth Patient Portal offered by [facility] by registering at the following website: [email or web address]. By joining [facility]’s FollowMyHealth portal, you will also be able to view your health information using other applications (apps) compatible with our system.

## 2023-06-26 NOTE — OB PROVIDER H&P - NS ATTEND AMEND GEN_ALL_CORE FT
OB Attending    26 y/o  37 weeks presenting for cerclage removal. RBA reviewed and written informed consent obtained.     SAMIA Moralez MD

## 2023-06-26 NOTE — OB PROVIDER H&P - NSRISKFACTORS_OBGYN_ALL_OB
Spoke with patient who stated that he would like a referral to a neurologist for his hip/leg pain. Patient c/o pain down his left leg. I informed the patient that a visit may be needed to discuss with Dr. Mcguire, patient verbalized understanding. Please advise.   Yes

## 2023-06-26 NOTE — DISCHARGE NOTE ANTEPARTUM - CARE PROVIDER_API CALL
Sample-Temitope Bolivar  Obstetrics and Gynecology  [address], [address]  [place] [address]  Phone: [phone]  Fax: [phone]  Follow Up Time:

## 2023-06-26 NOTE — OB RN INTRAOPERATIVE NOTE - NSOBSELHIDDEN_OBGYN_ALL_OB_FT
[NSOBAttendingProcedure1_OBGYN_ALL_OB_FT:[id number]],[NSRNCirculatorProcedure1_OBGYN_ALL_OB_FT:[id number]]

## 2023-06-26 NOTE — OB PROVIDER H&P - NSICDXPASTMEDICALHX_GEN_ALL_CORE_FT
PAST MEDICAL HISTORY:  History of PCOS     Incompetent cervix     PAC (premature atrial contraction)

## 2023-06-26 NOTE — DISCHARGE NOTE ANTEPARTUM - HOSPITAL COURSE
Patient underwent uncomplicated cervical cerclage removal. Patient voided spontaneously after procedure. +FHR documented before and after cerclage removal. Patient was discharged home in stable condition.

## 2023-06-26 NOTE — OB RN PATIENT PROFILE - FALL HARM RISK - UNIVERSAL INTERVENTIONS
Bed in lowest position, wheels locked, appropriate side rails in place/Call bell, personal items and telephone in reach/Instruct patient to call for assistance before getting out of bed or chair/Non-slip footwear when patient is out of bed/[place] to call system/Physically safe environment - no spills, clutter or unnecessary equipment/Purposeful Proactive Rounding/Room/bathroom lighting operational, light cord in reach

## 2023-06-26 NOTE — OB RN PATIENT PROFILE - ANESTHESIA, PREVIOUS REACTION, PROFILE
How Severe Are Your Spot(S)?: mild What Is The Reason For Today's Visit?: Full Body Skin Examination What Is The Reason For Today's Visit? (Being Monitored For X): concerning skin lesions on an annual basis none

## 2023-06-26 NOTE — OB PROVIDER H&P - HISTORY OF PRESENT ILLNESS
28yo female  RA 7/15/23 presents @37.2 weeks for scheduled cerclage removal. Denies shortness of breath, fever, chills or cough.  Denies vaginal bleeding, leakage of fluids, or contractions today. Reports positive fetal movement.    Antepartum Course: anatomy scan wnl, passed GCT, GBS negative 23, NIPT low risk, amniocentesis performed 3/5/23 to reduce bulging membranes, rescue Shirodkar cerclage placed 3/6/23,  FISH  karyotype wnl, vaginal progesterone used 20-37 weeks, maternal h/o PACs & PVCs pt followed up with cardiology, EKG NSR, ECHO EF 67th% ( see in chart )  Prenatal labs:  -T&S: B+  -Rubella: Immune  -Hep B: Nonreactive  -HIV: Nonreactive  -RPR: Negative  Ultrasounds: Last [facility] sonogram as per pt @30 weeks EFW 4#8

## 2023-06-26 NOTE — OB PROVIDER H&P - ASSESSMENT
26yo  @ 37.2w  Dx: Scheduled cerclage removal    - Admit to L&D  - NPO  - EFM/TOCO  - IV access, CBC/T&C/RPR  - Pepcid and Bicitra as per pre-op policy  - Anesthesia consult   - Blood transfusion consent  - Operative Consent to be discussed and obtained by Dr. Britany Bolivar  - Plan to move to OR on time schedule  - Discussed with Dr. Britany Oh, PAC

## 2023-06-26 NOTE — OB PROVIDER H&P - NSHPPHYSICALEXAM_GEN_ALL_CORE
Vitals: ICU Vital Signs Last 24 Hrs  T(C): 36.8 (26 Jun 2023 06:18), Max: 36.8 (26 Jun 2023 06:10)  T(F): 98.2 (26 Jun 2023 06:18), Max: 98.24 (26 Jun 2023 06:10)  HR: 90 (26 Jun 2023 06:18) (90 - 90)  BP: 120/79 (26 Jun 2023 06:18) (120/79 - 120/79)  BP(mean): --  ABP: --  ABP(mean): --  RR: 18 (26 Jun 2023 06:18) (18 - 18)  SpO2: --      General: A&O x3  Heart: RRR, S1 & S2  Lungs: CTA b/l, good inspiratory/expiratory effort. No ronchi, no rales  Abdomen: Soft, Gravid, TOCO in place    EFM: baseline , moderate variability, +accels, + spontaneous decel x1 with recovery to Category 1, reactive  TOCO: contractions noted, irregular    Extremities: FROM, bilateral 1+ LE edema  Neuro: grossly intact

## 2023-06-26 NOTE — DISCHARGE NOTE ANTEPARTUM - ADDITIONAL INSTRUCTIONS
You may have have some vaginal spotting after this procedure - this is normal. Follow up with your doctor as scheduled for routine prenatal care. Return to Labor and Delivery if you experience severely painful and regular contractions, decreased fetal movement, leakage of fluid, or vaginal bleeding.

## 2023-06-26 NOTE — OB PROVIDER H&P - NS_CSECTION_OBGYN_ALL_OB_NU
PATIENT INFORMATION         Lab -- Please go to the lab now to have your labwork completed. Your doctor's office will notify you of your results once reviewed.    Anticipatory guidance discussed  Bicycle helmets  Breast self-exam  Drugs, ETOH, and tobacco  Importance of regular dental care  Importance of regular exercise  Importance of varied diet  Limit TV, media violence  Minimize junk food  Puberty  Safe storage of any firearms in the home  Seat belts    Follow-Up  - Return in 1 year for your yearly well visit.    13-17 years old Health and Safety Tips - The following hyperlinks are available to access via [employer]    Parent Education from Healthy Parent    Educación para padres sobre niños sanos    Additional Educational Resources:  For additional resources regarding your symptoms, diagnosis, or further health information, please visit the Discover a Healthier You section on [email or web address]/ or the Online Health Resources section in [employer].  
0

## 2023-06-26 NOTE — BRIEF OPERATIVE NOTE - NSICDXBRIEFPOSTOP_GEN_ALL_CORE_FT
POST-OP DIAGNOSIS:  History of cervical cerclage, currently pregnant, third trimester 26-Jun-2023 09:22:56  Tatiana Heath

## 2023-06-26 NOTE — DISCHARGE NOTE ANTEPARTUM - NS MD DC FALL RISK RISK
For information on Fall & Injury Prevention, visit: [email or web address] OR  [email or web address] OR  https://www.cdc.gov/steadi/patient.html

## 2023-06-26 NOTE — OB RN PATIENT PROFILE - EDUCATION OF THE PLACEMENT OF INFANT DURING SKIN TO SKIN: THE INFANT IS TO BE PLACED BELLY DOWN DIRECTLY ON PARENT'S CHEST, POSITIONED WITH INFANT'S FACE TOWARD PARENT'S FACE, SO THE PARENT CAN OBSERVE THE INFANT’S COLOR AT ALL TIMES.
Patient positioned in supine position, perineum area prepped with chlorhexidene Gluconate and patient draped per sterile technique. Per verbal order read back by Ed Helm MD, Urojet 10mL 2% lidocaine jelly to be instilled into urethra.  Urojet- 10ml 2% Lidocaine jelly instilled into the urethra.    Urojet 2%  Lot#: [id number]  Expiration date: 12/20  : [employer]  NDC: [address]-6    [place] Protocol    A. Pre-procedure verification complete Yes  1-relevant information / documentation available, reviewed and properly matched to the patient; 2-consent accurate and complete, 3-equipment and supplies available    B. Site marking complete N/A  Site marked if not in continuous attendance with patient    C. TIME OUT completed Yes  Time Out was conducted just prior to starting procedure to verify the eight required elements: 1-patient identity, 2-consent accurate and complete, 3-position, 4-correct side/site marked (if applicable), 5-procedure, 6-relevant images / results properly labeled and displayed (if applicable), 7-antibiotics / irrigation fluids (if applicable), 8-safety precautions.    After procedure perineum area rinsed. Discharge instructions reviewed with patient. Patient verbalized understanding of discharge instructions and discharged ambulatory.  Malka Mcnulyt RN..................9/11/2019  10:36 AM  
Statement Selected

## 2023-06-29 ENCOUNTER — APPOINTMENT (OUTPATIENT)
Dept: OBGYN | Facility: CLINIC | Age: 28
End: 2023-06-29

## 2023-06-29 ENCOUNTER — INPATIENT (INPATIENT)
Facility: HOSPITAL | Age: 28
LOS: 2 days | Discharge: HOME | End: 2023-07-02
Attending: OBSTETRICS & GYNECOLOGY | Admitting: OBSTETRICS & GYNECOLOGY
Payer: MEDICAID

## 2023-06-29 ENCOUNTER — APPOINTMENT (OUTPATIENT)
Dept: ANTEPARTUM | Facility: CLINIC | Age: 28
End: 2023-06-29

## 2023-06-29 VITALS
RESPIRATION RATE: 14 BRPM | DIASTOLIC BLOOD PRESSURE: 73 MMHG | SYSTOLIC BLOOD PRESSURE: 132 MMHG | HEART RATE: 92 BPM | TEMPERATURE: 98 F

## 2023-06-29 DIAGNOSIS — O26.899 OTHER SPECIFIED PREGNANCY RELATED CONDITIONS, UNSPECIFIED TRIMESTER: ICD-10-CM

## 2023-06-29 DIAGNOSIS — O34.30 MATERNAL CARE FOR CERVICAL INCOMPETENCE, UNSPECIFIED TRIMESTER: Chronic | ICD-10-CM

## 2023-06-29 LAB
BASOPHILS # BLD AUTO: 0.01 K/UL — SIGNIFICANT CHANGE UP (ref 0–0.2)
BASOPHILS NFR BLD AUTO: 0.1 % — SIGNIFICANT CHANGE UP (ref 0–2)
BLD GP AB SCN SERPL QL: NEGATIVE — SIGNIFICANT CHANGE UP
COVID-19 SPIKE DOMAIN AB INTERP: POSITIVE
COVID-19 SPIKE DOMAIN ANTIBODY RESULT: >250 U/ML — HIGH
EOSINOPHIL # BLD AUTO: 0.13 K/UL — SIGNIFICANT CHANGE UP (ref 0–0.5)
EOSINOPHIL NFR BLD AUTO: 1.3 % — SIGNIFICANT CHANGE UP (ref 0–6)
HCT VFR BLD CALC: 38.4 % — SIGNIFICANT CHANGE UP (ref 34.5–45)
HGB BLD-MCNC: 12.4 G/DL — SIGNIFICANT CHANGE UP (ref 11.5–15.5)
IANC: 7.77 K/UL — HIGH (ref 1.8–7.4)
IMM GRANULOCYTES NFR BLD AUTO: 0.8 % — SIGNIFICANT CHANGE UP (ref 0–0.9)
LYMPHOCYTES # BLD AUTO: 1.42 K/UL — SIGNIFICANT CHANGE UP (ref 1–3.3)
LYMPHOCYTES # BLD AUTO: 14 % — SIGNIFICANT CHANGE UP (ref 13–44)
MCHC RBC-ENTMCNC: 30.5 PG — SIGNIFICANT CHANGE UP (ref 27–34)
MCHC RBC-ENTMCNC: 32.3 GM/DL — SIGNIFICANT CHANGE UP (ref 32–36)
MCV RBC AUTO: 94.3 FL — SIGNIFICANT CHANGE UP (ref 80–100)
MONOCYTES # BLD AUTO: 0.73 K/UL — SIGNIFICANT CHANGE UP (ref 0–0.9)
MONOCYTES NFR BLD AUTO: 7.2 % — SIGNIFICANT CHANGE UP (ref 2–14)
NEUTROPHILS # BLD AUTO: 7.77 K/UL — HIGH (ref 1.8–7.4)
NEUTROPHILS NFR BLD AUTO: 76.6 % — SIGNIFICANT CHANGE UP (ref 43–77)
NRBC # BLD: 0 /100 WBCS — SIGNIFICANT CHANGE UP (ref 0–0)
NRBC # FLD: 0 K/UL — SIGNIFICANT CHANGE UP (ref 0–0)
PLATELET # BLD AUTO: 272 K/UL — SIGNIFICANT CHANGE UP (ref 150–400)
RBC # BLD: 4.07 M/UL — SIGNIFICANT CHANGE UP (ref 3.8–5.2)
RBC # FLD: 13.4 % — SIGNIFICANT CHANGE UP (ref 10.3–14.5)
RH IG SCN BLD-IMP: POSITIVE — SIGNIFICANT CHANGE UP
SARS-COV-2 IGG+IGM SERPL QL IA: >250 U/ML — HIGH
SARS-COV-2 IGG+IGM SERPL QL IA: POSITIVE
T PALLIDUM AB TITR SER: NEGATIVE — SIGNIFICANT CHANGE UP
WBC # BLD: 10.14 K/UL — SIGNIFICANT CHANGE UP (ref 3.8–10.5)
WBC # FLD AUTO: 10.14 K/UL — SIGNIFICANT CHANGE UP (ref 3.8–10.5)

## 2023-06-29 PROCEDURE — 93010 ELECTROCARDIOGRAM REPORT: CPT

## 2023-06-29 PROCEDURE — 59025 FETAL NON-STRESS TEST: CPT | Mod: 26

## 2023-06-29 RX ORDER — SODIUM CHLORIDE 9 MG/ML
1000 INJECTION, SOLUTION INTRAVENOUS ONCE
Refills: 0 | Status: COMPLETED | OUTPATIENT
Start: 2023-06-29 | End: 2023-06-29

## 2023-06-29 RX ORDER — SODIUM CHLORIDE 9 MG/ML
1000 INJECTION, SOLUTION INTRAVENOUS
Refills: 0 | Status: DISCONTINUED | OUTPATIENT
Start: 2023-06-29 | End: 2023-06-30

## 2023-06-29 RX ORDER — CITRIC ACID/SODIUM CITRATE 300-500 MG
15 SOLUTION, ORAL ORAL EVERY 6 HOURS
Refills: 0 | Status: DISCONTINUED | OUTPATIENT
Start: 2023-06-29 | End: 2023-06-30

## 2023-06-29 RX ORDER — OXYTOCIN 10 UNIT/ML
333.33 VIAL (ML) INJECTION
Qty: 20 | Refills: 0 | Status: DISCONTINUED | OUTPATIENT
Start: 2023-06-29 | End: 2023-06-30

## 2023-06-29 RX ORDER — CHLORHEXIDINE GLUCONATE 213 G/1000ML
1 SOLUTION TOPICAL DAILY
Refills: 0 | Status: DISCONTINUED | OUTPATIENT
Start: 2023-06-29 | End: 2023-06-30

## 2023-06-29 RX ORDER — OXYTOCIN 10 UNIT/ML
2 VIAL (ML) INJECTION
Qty: 30 | Refills: 0 | Status: DISCONTINUED | OUTPATIENT
Start: 2023-06-29 | End: 2023-06-30

## 2023-06-29 RX ADMIN — SODIUM CHLORIDE 1000 MILLILITER(S): 9 INJECTION, SOLUTION INTRAVENOUS at 11:10

## 2023-06-29 RX ADMIN — Medication 2 MILLIUNIT(S)/MIN: at 14:17

## 2023-06-29 RX ADMIN — SODIUM CHLORIDE 125 MILLILITER(S): 9 INJECTION, SOLUTION INTRAVENOUS at 14:17

## 2023-06-29 RX ADMIN — CHLORHEXIDINE GLUCONATE 1 APPLICATION(S): 213 SOLUTION TOPICAL at 14:17

## 2023-06-29 NOTE — OB PROVIDER TRIAGE NOTE - HISTORY OF PRESENT ILLNESS
Ms. EDDIE WILDE is a 28y  @ 37w5d p/w gush of fluid @ 9a, light green. + FM. + occasional mild tightness. Denies vb. EFW 3200. GBS negative ()  PNC: Dr. Garg. C/b short cervix @ 20w w cerclage placement and removal on . Denies prenatal issues or complications. Pt denies complications with blood pressure and/or blood sugar.

## 2023-06-29 NOTE — OB PROVIDER H&P - ASSESSMENT
Ms. EDDIE WILDE is a 28y  @ 37w5d p/w gush of fluid @ 9a, light green w evidence of SROM, light meconium. EFW 3200. GBS negative ()  PNC: Dr. Garg. C/b short cervix @ 20w w cerclage placement and removal on .     - Late deceleration @ 10:52. Pt moved to right side, I liter LR started. Dr. Hollis notified.   - Improvement of tracing to Category 1.     Plan  - Admit to Labor and Delivery for induction of labor for SROM @ 9a, light meconium present  - Continuous EFM  - NPO due to late deceleration, continue to monitor   - Consent signed  - Standard labs (T&S, CBC, RPR, covid serology)  - Induction/augmentation agent: Pitocin   - Consider re-examination in 4 hours     Informed consent was obtained. The following was discussed:  - Induction/augmentation of labor: use of medication and/or cook balloon to begin or enhance labor  - Obstetrical management including internal fetal/contraction monitoring  - Normal vaginal delivery  - Possible  section    Risks, benefits, alternatives, and possible complications have been discussed in detail with the patient in English, patient's preferred language, demonstrating understanding, all questions answered.. Pre-admission, admission, and post admission procedures and expectations were discussed in detail. All questions answered, all appropriate hospital consents were signed. Anticipate normal vaginal delivery.    Plan d/w Dr. oHllis. Dr. Nixon, PGY3 made aware.

## 2023-06-29 NOTE — OB RN PATIENT PROFILE - EDUCATION OF PARENTS ON THE BENEFITS OF SKIN TO SKIN AND BREASTFEEDING TO BOTH MOTHER AND INFANT.
Received form and placed in Dr Sharon Carney's basket.  Shamika Dalton MA/  For Teams Jemal     Statement Selected

## 2023-06-29 NOTE — OB PROVIDER TRIAGE NOTE - NSHPPHYSICALEXAM_GEN_ALL_CORE
T(C): 36.7 (06-29-23 @ 10:18), Max: 36.7 (06-29-23 @ 10:18)  HR: 106 (06-29-23 @ 11:07) (85 - 106)  BP: 122/66 (06-29-23 @ 11:07) (122/66 - 132/73)  RR: 14 (06-29-23 @ 10:18) (14 - 14)  SpO2: --  General: Female sitting comfortably in no apparent distress  Head: Normocephalic. Atraumatic.   Eyes: No discharge, lids normal, conjunctiva normal  Lungs: No resp distress  Abdomen: Soft, nontender. Gravid.   TAUS:  Sono saved in ASOB.   NST:  Reactive. Category 1.   Neuro: No facial asymmetry, no slurred speech, moves all 4 extremities  Mood: Alert and lucid, appropriate mood and affect

## 2023-06-29 NOTE — OB PROVIDER TRIAGE NOTE - NSOBPROVIDERNOTE_OBGYN_ALL_OB_FT
Ms. EDDIE WILDE is a 28y  @ 37w5d p/w gush of fluid @ 9a, light green w evidence of SROM, light meconium. EFW 3200. GBS negative ()  PNC: Dr. Garg. C/b short cervix @ 20w w cerclage placement and removal on .     - Late deceleration @ 10:52. Pt moved to right side, I liter LR started. Dr. Hollis notified.   - Improvement of tracing to Category 1.     Plan  - Admit to Labor and Delivery for induction of labor for SROM @ 9a, light meconium present  - Continuous EFM  - NPO due to late deceleration, continue to monitor   - Consent signed  - Standard labs (T&S, CBC, RPR, covid serology)  - Induction/augmentation agent: Pitocin   - Consider re-examination in 4 hours     Informed consent was obtained. The following was discussed:  - Induction/augmentation of labor: use of medication and/or cook balloon to begin or enhance labor  - Obstetrical management including internal fetal/contraction monitoring  - Normal vaginal delivery  - Possible  section    Risks, benefits, alternatives, and possible complications have been discussed in detail with the patient in English, patient's preferred language, demonstrating understanding, all questions answered.. Pre-admission, admission, and post admission procedures and expectations were discussed in detail. All questions answered, all appropriate hospital consents were signed. Anticipate normal vaginal delivery.    Plan d/w Dr. Hollis. Dr. Nixon, PGY3 made aware.

## 2023-06-30 DIAGNOSIS — O42.10 PREMATURE RUPTURE OF MEMBRANES, ONSET OF LABOR MORE THAN 24 HOURS FOLLOWING RUPTURE, UNSPECIFIED WEEKS OF GESTATION: ICD-10-CM

## 2023-06-30 PROCEDURE — 59409 OBSTETRICAL CARE: CPT | Mod: U7,UB,GC

## 2023-06-30 RX ORDER — IBUPROFEN 200 MG
600 TABLET ORAL EVERY 6 HOURS
Refills: 0 | Status: DISCONTINUED | OUTPATIENT
Start: 2023-06-30 | End: 2023-07-02

## 2023-06-30 RX ORDER — IBUPROFEN 200 MG
600 TABLET ORAL EVERY 6 HOURS
Refills: 0 | Status: COMPLETED | OUTPATIENT
Start: 2023-06-30 | End: 2024-05-28

## 2023-06-30 RX ORDER — ACETAMINOPHEN 500 MG
975 TABLET ORAL
Refills: 0 | Status: DISCONTINUED | OUTPATIENT
Start: 2023-06-30 | End: 2023-07-02

## 2023-06-30 RX ORDER — KETOROLAC TROMETHAMINE 30 MG/ML
30 SYRINGE (ML) INJECTION ONCE
Refills: 0 | Status: DISCONTINUED | OUTPATIENT
Start: 2023-06-30 | End: 2023-06-30

## 2023-06-30 RX ORDER — SIMETHICONE 80 MG/1
80 TABLET, CHEWABLE ORAL EVERY 4 HOURS
Refills: 0 | Status: DISCONTINUED | OUTPATIENT
Start: 2023-06-30 | End: 2023-07-02

## 2023-06-30 RX ORDER — PRAMOXINE HYDROCHLORIDE 150 MG/15G
1 AEROSOL, FOAM RECTAL EVERY 4 HOURS
Refills: 0 | Status: DISCONTINUED | OUTPATIENT
Start: 2023-06-30 | End: 2023-07-02

## 2023-06-30 RX ORDER — DIBUCAINE 1 %
1 OINTMENT (GRAM) RECTAL EVERY 6 HOURS
Refills: 0 | Status: DISCONTINUED | OUTPATIENT
Start: 2023-06-30 | End: 2023-07-02

## 2023-06-30 RX ORDER — DIPHENHYDRAMINE HCL 50 MG
25 CAPSULE ORAL EVERY 6 HOURS
Refills: 0 | Status: DISCONTINUED | OUTPATIENT
Start: 2023-06-30 | End: 2023-07-02

## 2023-06-30 RX ORDER — OXYCODONE HYDROCHLORIDE 5 MG/1
5 TABLET ORAL
Refills: 0 | Status: DISCONTINUED | OUTPATIENT
Start: 2023-06-30 | End: 2023-07-02

## 2023-06-30 RX ORDER — AER TRAVELER 0.5 G/1
1 SOLUTION RECTAL; TOPICAL EVERY 4 HOURS
Refills: 0 | Status: DISCONTINUED | OUTPATIENT
Start: 2023-06-30 | End: 2023-07-02

## 2023-06-30 RX ORDER — SODIUM CHLORIDE 9 MG/ML
1000 INJECTION, SOLUTION INTRAVENOUS ONCE
Refills: 0 | Status: COMPLETED | OUTPATIENT
Start: 2023-06-30 | End: 2023-06-30

## 2023-06-30 RX ORDER — OXYTOCIN 10 UNIT/ML
333.33 VIAL (ML) INJECTION
Qty: 20 | Refills: 0 | Status: DISCONTINUED | OUTPATIENT
Start: 2023-06-30 | End: 2023-07-02

## 2023-06-30 RX ORDER — SODIUM CHLORIDE 9 MG/ML
3 INJECTION INTRAMUSCULAR; INTRAVENOUS; SUBCUTANEOUS EVERY 8 HOURS
Refills: 0 | Status: DISCONTINUED | OUTPATIENT
Start: 2023-06-30 | End: 2023-07-02

## 2023-06-30 RX ORDER — LANOLIN
1 OINTMENT (GRAM) TOPICAL EVERY 6 HOURS
Refills: 0 | Status: DISCONTINUED | OUTPATIENT
Start: 2023-06-30 | End: 2023-07-02

## 2023-06-30 RX ORDER — OXYCODONE HYDROCHLORIDE 5 MG/1
5 TABLET ORAL ONCE
Refills: 0 | Status: DISCONTINUED | OUTPATIENT
Start: 2023-06-30 | End: 2023-07-02

## 2023-06-30 RX ORDER — MAGNESIUM HYDROXIDE 400 MG/1
30 TABLET, CHEWABLE ORAL
Refills: 0 | Status: DISCONTINUED | OUTPATIENT
Start: 2023-06-30 | End: 2023-07-02

## 2023-06-30 RX ORDER — OXYTOCIN 10 UNIT/ML
2 VIAL (ML) INJECTION
Qty: 30 | Refills: 0 | Status: DISCONTINUED | OUTPATIENT
Start: 2023-06-30 | End: 2023-06-30

## 2023-06-30 RX ORDER — HYDROCORTISONE 1 %
1 OINTMENT (GRAM) TOPICAL EVERY 6 HOURS
Refills: 0 | Status: DISCONTINUED | OUTPATIENT
Start: 2023-06-30 | End: 2023-07-02

## 2023-06-30 RX ORDER — BENZOCAINE 10 %
1 GEL (GRAM) MUCOUS MEMBRANE EVERY 6 HOURS
Refills: 0 | Status: DISCONTINUED | OUTPATIENT
Start: 2023-06-30 | End: 2023-07-02

## 2023-06-30 RX ORDER — TETANUS TOXOID, REDUCED DIPHTHERIA TOXOID AND ACELLULAR PERTUSSIS VACCINE, ADSORBED 5; 2.5; 8; 8; 2.5 [IU]/.5ML; [IU]/.5ML; UG/.5ML; UG/.5ML; UG/.5ML
0.5 SUSPENSION INTRAMUSCULAR ONCE
Refills: 0 | Status: DISCONTINUED | OUTPATIENT
Start: 2023-06-30 | End: 2023-07-02

## 2023-06-30 RX ADMIN — Medication 1 APPLICATION(S): at 21:49

## 2023-06-30 RX ADMIN — Medication 2 MILLIUNIT(S)/MIN: at 04:34

## 2023-06-30 RX ADMIN — Medication 30 MILLIGRAM(S): at 15:45

## 2023-06-30 RX ADMIN — Medication 975 MILLIGRAM(S): at 21:26

## 2023-06-30 RX ADMIN — Medication 975 MILLIGRAM(S): at 22:25

## 2023-06-30 RX ADMIN — Medication 1 APPLICATION(S): at 21:48

## 2023-06-30 RX ADMIN — SODIUM CHLORIDE 1000 MILLILITER(S): 9 INJECTION, SOLUTION INTRAVENOUS at 05:40

## 2023-06-30 RX ADMIN — PRAMOXINE HYDROCHLORIDE 1 APPLICATION(S): 150 AEROSOL, FOAM RECTAL at 21:49

## 2023-06-30 RX ADMIN — SODIUM CHLORIDE 3 MILLILITER(S): 9 INJECTION INTRAMUSCULAR; INTRAVENOUS; SUBCUTANEOUS at 22:00

## 2023-06-30 RX ADMIN — SODIUM CHLORIDE 1000 MILLILITER(S): 9 INJECTION, SOLUTION INTRAVENOUS at 00:00

## 2023-06-30 RX ADMIN — Medication 30 MILLIGRAM(S): at 15:32

## 2023-06-30 RX ADMIN — SODIUM CHLORIDE 125 MILLILITER(S): 9 INJECTION, SOLUTION INTRAVENOUS at 04:35

## 2023-06-30 NOTE — OB RN DELIVERY SUMMARY - NSSELHIDDEN_OBGYN_ALL_OB_FT
[NS_DeliveryAttending1_OBGYN_ALL_OB_FT:[id number]],[NS_DeliveryAssist1_OBGYN_ALL_OB_FT:[id number]],[NS_DeliveryRN_OBGYN_ALL_OB_FT:[id number]]

## 2023-06-30 NOTE — OB NEONATOLOGY/PEDIATRICIAN DELIVERY SUMMARY - NSPEDSNEONOTESA_OBGYN_ALL_OB_FT
Peds called to LDR for heavy meconium and category 2 fetal tracing in a 37.6 wk AGA male born via  to a 29 y/o  mother.  Maternal medical history of PAC/PVC, followed by cardiology, last seen about 2 months ago, most recent Echo and EKG wnl. Prenatal history significant for cerclage at 20 weeks.  Maternal labs include Blood Type B+ , HIV - , RPR NR , Rubella I , Hep B - , GBS - on 23. ROM at 0900 on 23 with meconium fluids (ROM hours: 28H 42M --PROM).  Baby emerged vigorous, crying, was warmed, dried, suctioned, and stimulated with APGARS of 8/9. Resuscitation included: deep suction. Mom plans to initiate breastfeeding, consents Hep B vaccine and declines circ.  Highest maternal temp: 37.1. EOS 0.32.    Physical Exam:  Gen: no acute distress, +grimace  HEENT: +skull molding,  anterior fontanel open soft and flat, nondysmorphic facies, no cleft lip/palate, ears normal set, no ear pits or tags, nares clinically patent  Resp: Normal respiratory effort without grunting or retractions, good air entry b/l, coarse to auscultation bilaterally  Cardio: Present S1/S2, regular rate and rhythm, no murmurs  Abd: soft, non tender, non distended, umbilical cord with 3 vessels  Neuro: +palmar and plantar grasp, +suck, +juan, normal tone  Extremities: negative aguila and ortolani maneuvers, moving all extremities, no clavicular crepitus or stepoff  Skin: pink, warm  Genitals: Normal male anatomy, testicles palpable in scrotum b/l, Sarmad 1, anus patent Peds called to LDR for heavy meconium and category 2 fetal tracing in a 37.6 wk AGA male born via  to a 27 y/o  mother.  Maternal medical history of PAC/PVC, followed by cardiology, last seen about 2 months ago, most recent Echo and EKG wnl. Prenatal history significant for cerclage at 20 weeks.  Maternal labs include Blood Type B+ , HIV - , RPR NR , Rubella I , Hep B - , GBS - on 23. ROM at 0900 on 23 with meconium fluids (ROM hours: 28H 42M --PROM).  Baby emerged vigorous, crying, was warmed, dried, suctioned, and stimulated with APGARS of 8/9. Resuscitation included: deep suction. Mom plans to initiate breastfeeding, consents Hep B vaccine and declines circ.  Highest maternal temp: 37.2. EOS 0.37.    Physical Exam:  Gen: no acute distress, +grimace  HEENT: +skull molding,  anterior fontanel open soft and flat, nondysmorphic facies, no cleft lip/palate, ears normal set, no ear pits or tags, nares clinically patent  Resp: Normal respiratory effort without grunting or retractions, good air entry b/l, coarse to auscultation bilaterally  Cardio: Present S1/S2, regular rate and rhythm, no murmurs  Abd: soft, non tender, non distended, umbilical cord with 3 vessels  Neuro: +palmar and plantar grasp, +suck, +juan, normal tone  Extremities: negative aguila and ortolani maneuvers, moving all extremities, no clavicular crepitus or stepoff  Skin: pink, warm  Genitals: Normal male anatomy, testicles palpable in scrotum b/l, Sarmad 1, anus patent

## 2023-06-30 NOTE — OB RN DELIVERY SUMMARY - BABY A: APGAR 1 MIN COLOR, DELIVERY
(0) blue, pale Double Island Pedicle Flap Text: The defect edges were debeveled with a #15c scalpel blade.  Given the location of the defect, shape of the defect and the proximity to free margins a double island pedicle advancement flap was deemed most appropriate.  Using a sterile surgical marker, an appropriate advancement flap was drawn incorporating the defect, outlining the appropriate donor tissue and placing the expected incisions within the relaxed skin tension lines where possible.    The area thus outlined was incised deep to adipose tissue with a #15 scalpel blade.  The skin margins were undermined to an appropriate distance in all directions around the primary defect and laterally outward around the island pedicle utilizing iris scissors.  There was minimal undermining beneath the pedicle flap.

## 2023-06-30 NOTE — OB PROVIDER DELIVERY SUMMARY - NSPROVIDERDELIVERYNOTE_OBGYN_ALL_OB_FT
Patient fully dilated and pushing.  RML episiotomy cut.   of liveborn male infant. Head, shoulders and body delivered easily in OA position. Cord clamped and cut.  Infant to awaiting pediatricians.  Placenta delivered intact. Vaginal exam revealed intact cervix, vaginal walls, sulci. RML repaired in usual fashion with 2.0 chromic suture.  Bimanual exam performed, with minimal clot evacuated. Fundus and lower uterine segment firm. Excellent hemostasis. Count was correct x2.

## 2023-06-30 NOTE — OB PROVIDER LABOR PROGRESS NOTE - ASSESSMENT
A/P 28y  @37w5d IOL for PROM@9a  -IOL: c/w Pitocin  -Cat 1 tracing  -GBS neg  -EFW 3200  -Analgesia epi, for top off  -Anticipate     Alvarez Bullock, PGY-1
IOL for PROM@9a    -Labor: continue pitocin, pt making cervical change  -Fetus: cat 2 with intermittent late decels, maternal repositioning and 1L LR bolus given  -GBS: neg  -Pain: epidural in place, will contact anesthesia for top off    D/w Dr. Keila Lu PGY1
IOL for PROM@9a    -Labor: continue pitocin, cervical exam unchanged, IUPC placed to titrate pitocin  -Fetus: cat 1  -GBS: neg  -Pain: epidural in place  -Pt noted to be tachycardic to 110-120s, EKG sinus tachy, pt declining sx of dizziness/palpitations currently. Pt with known h/o of PVCs following with cardiology during the pregnancy with negative work-up. Will continue to monitor    D/w Dr. Keila Lu PGY1

## 2023-06-30 NOTE — OB RN DELIVERY SUMMARY - NS_RNDELIVATTEST_OBGYN_ALL_OB
Status post left adrenalectomy  Regular diet as tolerated   Bloated monitor for return of bowel function, antiemetics p.r.n.  Bowel regimen  Ambulate  Pain control  DVT/GI prophylaxis  Awaiting improvement kidney function prior to discharge   OB Provider reported that the vagina was inspected and no foreign body was found/Laps, needles and instrument count was correct

## 2023-06-30 NOTE — CHART NOTE - NSCHARTNOTEFT_GEN_A_CORE
Pt complains of rectal pressure  NST categ 2 - late decels - resolved with position changes  ctx q2  ve 4/80/-2  continue induction for prom

## 2023-06-30 NOTE — OB PROVIDER LABOR PROGRESS NOTE - NS_SUBJECTIVE/OBJECTIVE_OBGYN_ALL_OB_FT
Pt seen and examined at bedside for report of increased rectal pressure.    Vital Signs Last 24 Hrs  T(C): 37.1 (29 Jun 2023 20:10), Max: 37.2 (29 Jun 2023 12:30)  T(F): 98.78 (29 Jun 2023 20:10), Max: 99 (29 Jun 2023 13:16)  HR: 126 (29 Jun 2023 21:41) (78 - 134)  BP: 121/75 (29 Jun 2023 21:40) (80/48 - 151/85)  BP(mean): --  RR: 18 (29 Jun 2023 20:10) (14 - 19)  SpO2: 100% (29 Jun 2023 21:41) (97% - 100%)    Parameters below as of 29 Jun 2023 13:16  Patient On (Oxygen Delivery Method): room air
Pt seen and examined at bedside due to rectal pressure.    Vital Signs Last 24 Hrs  T(C): 36.9 (30 Jun 2023 06:08), Max: 37.2 (29 Jun 2023 12:30)  T(F): 98.42 (30 Jun 2023 06:08), Max: 99 (29 Jun 2023 13:16)  HR: 94 (30 Jun 2023 06:29) (70 - 134)  BP: 118/66 (30 Jun 2023 06:25) (80/48 - 151/85)  BP(mean): --  RR: 17 (30 Jun 2023 06:08) (14 - 19)  SpO2: 94% (30 Jun 2023 06:29) (90% - 100%)    Parameters below as of 29 Jun 2023 13:16  Patient On (Oxygen Delivery Method): room air
R1 Labor Note    S: Patient evaluated at bedside for cervical change. Reports more pain.    O:  T(C): 36.7 (06-29-23 @ 14:45), Max: 37.2 (06-29-23 @ 12:30)  HR: 103 (06-29-23 @ 17:12) (78 - 117)  BP: 107/58 (06-29-23 @ 17:12) (102/63 - 151/85)  RR: 19 (06-29-23 @ 14:45) (14 - 19)  SpO2: 99% (06-29-23 @ 17:11) (98% - 100%)

## 2023-06-30 NOTE — OB PROVIDER DELIVERY SUMMARY - NSSELHIDDEN_OBGYN_ALL_OB_FT
[NS_DeliveryAttending1_OBGYN_ALL_OB_FT:[id number]=],[NS_DeliveryAssist1_OBGYN_ALL_OB_FT:[id number]=]

## 2023-06-30 NOTE — OB RN DELIVERY SUMMARY - NS_SEPSISRSKCALC_OBGYN_ALL_OB_FT
EOS calculated successfully. EOS Risk Factor: 0.5/1000 live births ([facility] national incidence); GA=37w6d; Temp=99; ROM=28.7; GBS='Negative'; Antibiotics='No antibiotics or any antibiotics < 2 hrs prior to birth'

## 2023-07-01 ENCOUNTER — TRANSCRIPTION ENCOUNTER (OUTPATIENT)
Age: 28
End: 2023-07-01

## 2023-07-01 RX ORDER — SENNA PLUS 8.6 MG/1
2 TABLET ORAL DAILY
Refills: 0 | Status: DISCONTINUED | OUTPATIENT
Start: 2023-07-01 | End: 2023-07-02

## 2023-07-01 RX ORDER — IBUPROFEN 200 MG
1 TABLET ORAL
Qty: 0 | Refills: 0 | DISCHARGE
Start: 2023-07-01

## 2023-07-01 RX ORDER — ACETAMINOPHEN 500 MG
3 TABLET ORAL
Qty: 0 | Refills: 0 | DISCHARGE
Start: 2023-07-01

## 2023-07-01 RX ORDER — MAGNESIUM HYDROXIDE 400 MG/1
30 TABLET, CHEWABLE ORAL DAILY
Refills: 0 | Status: DISCONTINUED | OUTPATIENT
Start: 2023-07-01 | End: 2023-07-02

## 2023-07-01 RX ADMIN — Medication 975 MILLIGRAM(S): at 21:06

## 2023-07-01 RX ADMIN — Medication 600 MILLIGRAM(S): at 11:39

## 2023-07-01 RX ADMIN — Medication 975 MILLIGRAM(S): at 04:20

## 2023-07-01 RX ADMIN — Medication 1 APPLICATION(S): at 21:06

## 2023-07-01 RX ADMIN — SODIUM CHLORIDE 3 MILLILITER(S): 9 INJECTION INTRAMUSCULAR; INTRAVENOUS; SUBCUTANEOUS at 22:00

## 2023-07-01 RX ADMIN — Medication 600 MILLIGRAM(S): at 00:20

## 2023-07-01 RX ADMIN — Medication 600 MILLIGRAM(S): at 06:08

## 2023-07-01 RX ADMIN — Medication 975 MILLIGRAM(S): at 03:35

## 2023-07-01 RX ADMIN — Medication 600 MILLIGRAM(S): at 17:36

## 2023-07-01 RX ADMIN — Medication 975 MILLIGRAM(S): at 22:00

## 2023-07-01 RX ADMIN — Medication 975 MILLIGRAM(S): at 14:49

## 2023-07-01 RX ADMIN — Medication 600 MILLIGRAM(S): at 07:00

## 2023-07-01 RX ADMIN — SODIUM CHLORIDE 3 MILLILITER(S): 9 INJECTION INTRAMUSCULAR; INTRAVENOUS; SUBCUTANEOUS at 06:00

## 2023-07-01 RX ADMIN — SODIUM CHLORIDE 3 MILLILITER(S): 9 INJECTION INTRAMUSCULAR; INTRAVENOUS; SUBCUTANEOUS at 15:06

## 2023-07-01 RX ADMIN — Medication 600 MILLIGRAM(S): at 01:10

## 2023-07-01 RX ADMIN — Medication 975 MILLIGRAM(S): at 08:25

## 2023-07-01 RX ADMIN — Medication 975 MILLIGRAM(S): at 15:45

## 2023-07-01 RX ADMIN — Medication 600 MILLIGRAM(S): at 23:39

## 2023-07-01 RX ADMIN — MAGNESIUM HYDROXIDE 30 MILLILITER(S): 400 TABLET, CHEWABLE ORAL at 12:15

## 2023-07-01 RX ADMIN — Medication 975 MILLIGRAM(S): at 09:20

## 2023-07-01 RX ADMIN — SENNA PLUS 2 TABLET(S): 8.6 TABLET ORAL at 23:39

## 2023-07-01 RX ADMIN — Medication 600 MILLIGRAM(S): at 18:09

## 2023-07-01 RX ADMIN — Medication 1 TABLET(S): at 11:39

## 2023-07-01 RX ADMIN — Medication 600 MILLIGRAM(S): at 12:22

## 2023-07-01 RX ADMIN — PRAMOXINE HYDROCHLORIDE 1 APPLICATION(S): 150 AEROSOL, FOAM RECTAL at 21:06

## 2023-07-01 RX ADMIN — MAGNESIUM HYDROXIDE 30 MILLILITER(S): 400 TABLET, CHEWABLE ORAL at 23:39

## 2023-07-01 NOTE — PROGRESS NOTE ADULT - ASSESSMENT
A/P: 28y yo P1 PPD#1 s/p .  She is in stable condition without complaints.   -Continue routine PP care  -Continue the current pain medication  -Encourage regular diet  -DVT ppx: SCDs only when not ambulating  -discharge planning PPD#2    N Sample-MD [place]

## 2023-07-01 NOTE — LACTATION INITIAL EVALUATION - ORAL/MOTOR ACTIVITY
Mother's UDS on admit positive for benzodiazepines and THC.  Infant's UDS positive for benzodiazepines, meconium pending. LCSW consulted, DCFS plans to take custody at discharge. Had an elevated withdrawal score this morning (12), will consult NICU for evaluation per protocol.   normal

## 2023-07-01 NOTE — DISCHARGE NOTE OB - CARE PROVIDER_API CALL
Sample-Temitope Bolivar  Obstetrics and Gynecology  [address], [address]  [place] [address]  Phone: [phone]  Fax: [phone]  Follow Up Time:

## 2023-07-01 NOTE — DISCHARGE NOTE OB - MATERIALS PROVIDED
[facility] [place] Screening Program/[place]  Immunization Record/Guide to Postpartum Care/Shaken Baby Prevention Handout/Discharge Medication Information for Patients and Families Pocket Guide

## 2023-07-01 NOTE — LACTATION INITIAL EVALUATION - LACTATION INTERVENTIONS
mother states  If  is not  breastfeeding  effectively due to gestational age, pt instructed to hand express and pump and supplement with pumped colostrum after each feed utilizing an alternative feeding method.  breastfeeding . instructed  to triple feed  if nbn  not  effective . nurse aware./initiate/review safe skin-to-skin/initiate/review hand expression/initiate/review pumping guidelines and safe milk handling/initiate/review techniques for position and latch/review techniques to increase milk supply/initiate/review finger suck/initiate/review breast massage/compression/reviewed components of an effective feeding and at least 8 effective feedings per day required/reviewed importance of monitoring infant diapers, the breastfeeding log, and minimum output each day/reviewed risks of unnecessary formula supplementation/reviewed risks of artificial nipples/reviewed benefits and recommendations for rooming in/reviewed feeding on demand/by cue at least 8 times a day/reviewed indications of inadequate milk transfer that would require supplementation

## 2023-07-01 NOTE — DISCHARGE NOTE OB - PATIENT PORTAL LINK FT
You can access the FollowMyHealth Patient Portal offered by [facility] by registering at the following website: [email or web address]. By joining [employer]’s FollowMyHealth portal, you will also be able to view your health information using other applications (apps) compatible with our system.

## 2023-07-01 NOTE — DISCHARGE NOTE OB - CARE PLAN
1 Principal Discharge DX:	Vaginal delivery  Assessment and plan of treatment:	Routine PP care. Nothing per vagina

## 2023-07-01 NOTE — PROGRESS NOTE ADULT - SUBJECTIVE AND OBJECTIVE BOX
OB Attending Postpartum Note    S: Patient is doing well without complaints. Tolerates regular diet. She states lochia is in WNL. Ambulating without difficulty. Denies N/V. Voiding freely. Passing flatus. Pain well controlled with oral pain medications. She is breastfeeding.    O: Vital Signs Last 24 Hrs  T(C): 36.8 (01 Jul 2023 06:51), Max: 37.2 (30 Jun 2023 12:00)  T(F): 98.3 (01 Jul 2023 06:51), Max: 98.96 (30 Jun 2023 12:00)  HR: 92 (01 Jul 2023 06:51) (78 - 155)  BP: 103/58 (01 Jul 2023 06:51) (96/54 - 145/62)    RR: 18 (01 Jul 2023 06:51) (15 - 18)  SpO2: 98% (01 Jul 2023 06:51) (75% - 100%)    Parameters below as of 01 Jul 2023 06:51  Patient On (Oxygen Delivery Method): room air      Physical Exam:  General: NAD  Abdomen: soft, non-tender, non-distended  Vaginal: Lochia wnl  Extremities: No redness/swelling    acetaminophen     Tablet .. 975 milliGRAM(s) Oral <User Schedule>  benzocaine 20%/menthol 0.5% Spray 1 Spray(s) Topical every 6 hours PRN  dibucaine 1% Ointment 1 Application(s) Topical every 6 hours PRN  diphenhydrAMINE 25 milliGRAM(s) Oral every 6 hours PRN  diphtheria/tetanus/pertussis (acellular) Vaccine (Adacel) 0.5 milliLiter(s) IntraMuscular once  hydrocortisone 1% Cream 1 Application(s) Topical every 6 hours PRN  ibuprofen  Tablet. 600 milliGRAM(s) Oral every 6 hours  lanolin Ointment 1 Application(s) Topical every 6 hours PRN  magnesium hydroxide Suspension 30 milliLiter(s) Oral two times a day PRN  oxyCODONE    IR 5 milliGRAM(s) Oral every 3 hours PRN  oxyCODONE    IR 5 milliGRAM(s) Oral once PRN  oxytocin Infusion 333.333 milliUNIT(s)/Min IV Continuous <Continuous>  pramoxine 1%/zinc 5% Cream 1 Application(s) Topical every 4 hours PRN  prenatal multivitamin 1 Tablet(s) Oral daily  simethicone 80 milliGRAM(s) Chew every 4 hours PRN  sodium chloride 0.9% lock flush 3 milliLiter(s) IV Push every 8 hours  witch hazel Pads 1 Application(s) Topical every 4 hours PRN

## 2023-07-01 NOTE — DISCHARGE NOTE OB - NS MD DC FALL RISK RISK
For information on Fall & Injury Prevention, visit: [email or web address] OR  [email or web address] OR  https://www.cdc.gov/steadi/patient.html

## 2023-07-02 VITALS
HEART RATE: 100 BPM | OXYGEN SATURATION: 100 % | TEMPERATURE: 98 F | RESPIRATION RATE: 19 BRPM | SYSTOLIC BLOOD PRESSURE: 129 MMHG | DIASTOLIC BLOOD PRESSURE: 81 MMHG

## 2023-07-02 RX ADMIN — Medication 600 MILLIGRAM(S): at 12:30

## 2023-07-02 RX ADMIN — Medication 600 MILLIGRAM(S): at 18:03

## 2023-07-02 RX ADMIN — Medication 1 APPLICATION(S): at 06:17

## 2023-07-02 RX ADMIN — Medication 975 MILLIGRAM(S): at 04:00

## 2023-07-02 RX ADMIN — Medication 600 MILLIGRAM(S): at 06:17

## 2023-07-02 RX ADMIN — SODIUM CHLORIDE 3 MILLILITER(S): 9 INJECTION INTRAMUSCULAR; INTRAVENOUS; SUBCUTANEOUS at 06:43

## 2023-07-02 RX ADMIN — PRAMOXINE HYDROCHLORIDE 1 APPLICATION(S): 150 AEROSOL, FOAM RECTAL at 06:17

## 2023-07-02 RX ADMIN — Medication 975 MILLIGRAM(S): at 03:13

## 2023-07-02 RX ADMIN — SENNA PLUS 2 TABLET(S): 8.6 TABLET ORAL at 11:41

## 2023-07-02 RX ADMIN — Medication 600 MILLIGRAM(S): at 11:41

## 2023-07-02 RX ADMIN — MAGNESIUM HYDROXIDE 30 MILLILITER(S): 400 TABLET, CHEWABLE ORAL at 11:42

## 2023-07-02 RX ADMIN — Medication 600 MILLIGRAM(S): at 00:35

## 2023-07-02 RX ADMIN — Medication 975 MILLIGRAM(S): at 08:03

## 2023-07-02 RX ADMIN — SODIUM CHLORIDE 3 MILLILITER(S): 9 INJECTION INTRAMUSCULAR; INTRAVENOUS; SUBCUTANEOUS at 14:00

## 2023-07-02 RX ADMIN — Medication 975 MILLIGRAM(S): at 15:04

## 2023-07-02 RX ADMIN — Medication 1 TABLET(S): at 11:41

## 2023-07-02 RX ADMIN — Medication 600 MILLIGRAM(S): at 17:13

## 2023-07-02 RX ADMIN — Medication 975 MILLIGRAM(S): at 15:44

## 2023-07-02 RX ADMIN — Medication 600 MILLIGRAM(S): at 06:40

## 2023-07-02 RX ADMIN — Medication 975 MILLIGRAM(S): at 09:00

## 2023-07-06 DIAGNOSIS — R30.0 DYSURIA: ICD-10-CM

## 2023-07-06 RX ORDER — NITROFURANTOIN (MONOHYDRATE/MACROCRYSTALS) 25; 75 MG/1; MG/1
100 CAPSULE ORAL
Qty: 14 | Refills: 0 | Status: ACTIVE | COMMUNITY
Start: 2023-07-06 | End: 1900-01-01

## 2023-07-18 ENCOUNTER — NON-APPOINTMENT (OUTPATIENT)
Age: 28
End: 2023-07-18

## 2023-07-18 DIAGNOSIS — O26.86 PRURITIC URTICARIAL PAPULES AND PLAQUES OF PREGNANCY (PUPPP): ICD-10-CM

## 2023-07-18 RX ORDER — METHYLPREDNISOLONE 4 MG/1
4 TABLET ORAL
Qty: 1 | Refills: 0 | Status: ACTIVE | COMMUNITY
Start: 2023-07-18 | End: 1900-01-01

## 2023-07-24 RX ORDER — HYDROCORTISONE ACETATE 25 MG/1
25 SUPPOSITORY RECTAL
Qty: 30 | Refills: 3 | Status: ACTIVE | COMMUNITY
Start: 2023-07-24 | End: 1900-01-01

## 2023-08-10 ENCOUNTER — APPOINTMENT (OUTPATIENT)
Dept: OBGYN | Facility: CLINIC | Age: 28
End: 2023-08-10
Payer: MEDICAID

## 2023-08-10 VITALS
SYSTOLIC BLOOD PRESSURE: 106 MMHG | WEIGHT: 156 LBS | DIASTOLIC BLOOD PRESSURE: 71 MMHG | HEIGHT: 61 IN | BODY MASS INDEX: 29.45 KG/M2

## 2023-08-10 DIAGNOSIS — N89.8 OTHER SPECIFIED NONINFLAMMATORY DISORDERS OF VAGINA: ICD-10-CM

## 2023-08-10 PROCEDURE — 99213 OFFICE O/P EST LOW 20 MIN: CPT | Mod: TH

## 2023-08-10 RX ORDER — METRONIDAZOLE 500 MG/1
500 TABLET ORAL TWICE DAILY
Qty: 14 | Refills: 0 | Status: ACTIVE | COMMUNITY
Start: 2023-08-10 | End: 1900-01-01

## 2023-08-12 NOTE — HISTORY OF PRESENT ILLNESS
[Postpartum Follow Up] : postpartum follow up [Delivery Date: ___] : on [unfilled] [] : delivered by vaginal delivery [Male] : Delivery History: baby boy [Wt. ___] : weighing [unfilled] [Breastfeeding] : currently nursing [Clean/Dry/Intact] : clean, dry and intact [Back to Normal] : is back to normal in size [None] : no vaginal bleeding [Healing Well] : is healing well [Examination Of The Breasts] : breasts are normal [Doing Well] : is doing well [FreeTextEntry9] : Short cervix, rescue cerclage [de-identified] : Estefani [de-identified] : vaginal irritation [de-identified] : vagina with malodorous discharge [de-identified] : 29 y/o F presenting for 6 week PP visit -vaginal discharge with odor on exam - f/u affirm - Rx PO flagyl -Patient cleared to resume intercourse and exercise -Patient counseled on contraception options, none desired at present -f/u for 3 months for annual

## 2023-08-13 LAB
CANDIDA VAG CYTO: NOT DETECTED
G VAGINALIS+PREV SP MTYP VAG QL MICRO: NOT DETECTED
T VAGINALIS VAG QL WET PREP: NOT DETECTED

## 2023-10-03 ENCOUNTER — APPOINTMENT (OUTPATIENT)
Dept: OBGYN | Facility: CLINIC | Age: 28
End: 2023-10-03
Payer: MEDICAID

## 2023-10-03 VITALS
BODY MASS INDEX: 29.07 KG/M2 | SYSTOLIC BLOOD PRESSURE: 115 MMHG | HEIGHT: 61 IN | DIASTOLIC BLOOD PRESSURE: 76 MMHG | WEIGHT: 154 LBS

## 2023-10-03 DIAGNOSIS — N76.4 ABSCESS OF VULVA: ICD-10-CM

## 2023-10-03 PROCEDURE — 99213 OFFICE O/P EST LOW 20 MIN: CPT

## 2023-10-03 RX ORDER — METRONIDAZOLE 10 MG/G
1 GEL TOPICAL TWICE DAILY
Qty: 1 | Refills: 0 | Status: ACTIVE | COMMUNITY
Start: 2023-10-03 | End: 1900-01-01

## 2023-10-17 ENCOUNTER — EMERGENCY (EMERGENCY)
Facility: HOSPITAL | Age: 28
LOS: 1 days | Discharge: HOME | End: 2023-10-17
Attending: EMERGENCY MEDICINE | Admitting: EMERGENCY MEDICINE
Payer: MEDICAID

## 2023-10-17 VITALS
HEIGHT: 61 IN | RESPIRATION RATE: 18 BRPM | TEMPERATURE: 98 F | HEART RATE: 69 BPM | WEIGHT: 156.09 LBS | DIASTOLIC BLOOD PRESSURE: 79 MMHG | SYSTOLIC BLOOD PRESSURE: 112 MMHG | OXYGEN SATURATION: 98 %

## 2023-10-17 DIAGNOSIS — O34.30 MATERNAL CARE FOR CERVICAL INCOMPETENCE, UNSPECIFIED TRIMESTER: Chronic | ICD-10-CM

## 2023-10-17 LAB
ALBUMIN SERPL ELPH-MCNC: 3.8 G/DL — SIGNIFICANT CHANGE UP (ref 3.3–5)
ALBUMIN SERPL ELPH-MCNC: 3.8 G/DL — SIGNIFICANT CHANGE UP (ref 3.3–5)
ALP SERPL-CCNC: 123 U/L — HIGH (ref 40–120)
ALP SERPL-CCNC: 123 U/L — HIGH (ref 40–120)
ALT FLD-CCNC: 74 U/L — SIGNIFICANT CHANGE UP (ref 12–78)
ALT FLD-CCNC: 74 U/L — SIGNIFICANT CHANGE UP (ref 12–78)
ANION GAP SERPL CALC-SCNC: 4 MMOL/L — LOW (ref 5–17)
ANION GAP SERPL CALC-SCNC: 4 MMOL/L — LOW (ref 5–17)
AST SERPL-CCNC: 29 U/L — SIGNIFICANT CHANGE UP (ref 15–37)
AST SERPL-CCNC: 29 U/L — SIGNIFICANT CHANGE UP (ref 15–37)
BASOPHILS # BLD AUTO: 0.03 K/UL — SIGNIFICANT CHANGE UP (ref 0–0.2)
BASOPHILS # BLD AUTO: 0.03 K/UL — SIGNIFICANT CHANGE UP (ref 0–0.2)
BASOPHILS NFR BLD AUTO: 0.6 % — SIGNIFICANT CHANGE UP (ref 0–2)
BASOPHILS NFR BLD AUTO: 0.6 % — SIGNIFICANT CHANGE UP (ref 0–2)
BILIRUB SERPL-MCNC: 0.4 MG/DL — SIGNIFICANT CHANGE UP (ref 0.2–1.2)
BILIRUB SERPL-MCNC: 0.4 MG/DL — SIGNIFICANT CHANGE UP (ref 0.2–1.2)
BUN SERPL-MCNC: 12 MG/DL — SIGNIFICANT CHANGE UP (ref 7–23)
BUN SERPL-MCNC: 12 MG/DL — SIGNIFICANT CHANGE UP (ref 7–23)
CALCIUM SERPL-MCNC: 9.4 MG/DL — SIGNIFICANT CHANGE UP (ref 8.5–10.1)
CALCIUM SERPL-MCNC: 9.4 MG/DL — SIGNIFICANT CHANGE UP (ref 8.5–10.1)
CHLORIDE SERPL-SCNC: 106 MMOL/L — SIGNIFICANT CHANGE UP (ref 96–108)
CHLORIDE SERPL-SCNC: 106 MMOL/L — SIGNIFICANT CHANGE UP (ref 96–108)
CO2 SERPL-SCNC: 28 MMOL/L — SIGNIFICANT CHANGE UP (ref 22–31)
CO2 SERPL-SCNC: 28 MMOL/L — SIGNIFICANT CHANGE UP (ref 22–31)
CREAT SERPL-MCNC: 0.79 MG/DL — SIGNIFICANT CHANGE UP (ref 0.5–1.3)
CREAT SERPL-MCNC: 0.79 MG/DL — SIGNIFICANT CHANGE UP (ref 0.5–1.3)
EGFR: 104 ML/MIN/1.73M2 — SIGNIFICANT CHANGE UP
EGFR: 104 ML/MIN/1.73M2 — SIGNIFICANT CHANGE UP
EOSINOPHIL # BLD AUTO: 0.16 K/UL — SIGNIFICANT CHANGE UP (ref 0–0.5)
EOSINOPHIL # BLD AUTO: 0.16 K/UL — SIGNIFICANT CHANGE UP (ref 0–0.5)
EOSINOPHIL NFR BLD AUTO: 3.3 % — SIGNIFICANT CHANGE UP (ref 0–6)
EOSINOPHIL NFR BLD AUTO: 3.3 % — SIGNIFICANT CHANGE UP (ref 0–6)
GLUCOSE SERPL-MCNC: 106 MG/DL — HIGH (ref 70–99)
GLUCOSE SERPL-MCNC: 106 MG/DL — HIGH (ref 70–99)
HCG SERPL-ACNC: <1 MIU/ML — SIGNIFICANT CHANGE UP
HCG SERPL-ACNC: <1 MIU/ML — SIGNIFICANT CHANGE UP
HCT VFR BLD CALC: 40.4 % — SIGNIFICANT CHANGE UP (ref 34.5–45)
HCT VFR BLD CALC: 40.4 % — SIGNIFICANT CHANGE UP (ref 34.5–45)
HGB BLD-MCNC: 12.9 G/DL — SIGNIFICANT CHANGE UP (ref 11.5–15.5)
HGB BLD-MCNC: 12.9 G/DL — SIGNIFICANT CHANGE UP (ref 11.5–15.5)
IMM GRANULOCYTES NFR BLD AUTO: 0.2 % — SIGNIFICANT CHANGE UP (ref 0–0.9)
IMM GRANULOCYTES NFR BLD AUTO: 0.2 % — SIGNIFICANT CHANGE UP (ref 0–0.9)
LYMPHOCYTES # BLD AUTO: 2.04 K/UL — SIGNIFICANT CHANGE UP (ref 1–3.3)
LYMPHOCYTES # BLD AUTO: 2.04 K/UL — SIGNIFICANT CHANGE UP (ref 1–3.3)
LYMPHOCYTES # BLD AUTO: 41.5 % — SIGNIFICANT CHANGE UP (ref 13–44)
LYMPHOCYTES # BLD AUTO: 41.5 % — SIGNIFICANT CHANGE UP (ref 13–44)
MCHC RBC-ENTMCNC: 28 PG — SIGNIFICANT CHANGE UP (ref 27–34)
MCHC RBC-ENTMCNC: 28 PG — SIGNIFICANT CHANGE UP (ref 27–34)
MCHC RBC-ENTMCNC: 31.9 GM/DL — LOW (ref 32–36)
MCHC RBC-ENTMCNC: 31.9 GM/DL — LOW (ref 32–36)
MCV RBC AUTO: 87.6 FL — SIGNIFICANT CHANGE UP (ref 80–100)
MCV RBC AUTO: 87.6 FL — SIGNIFICANT CHANGE UP (ref 80–100)
MONOCYTES # BLD AUTO: 0.42 K/UL — SIGNIFICANT CHANGE UP (ref 0–0.9)
MONOCYTES # BLD AUTO: 0.42 K/UL — SIGNIFICANT CHANGE UP (ref 0–0.9)
MONOCYTES NFR BLD AUTO: 8.6 % — SIGNIFICANT CHANGE UP (ref 2–14)
MONOCYTES NFR BLD AUTO: 8.6 % — SIGNIFICANT CHANGE UP (ref 2–14)
NEUTROPHILS # BLD AUTO: 2.25 K/UL — SIGNIFICANT CHANGE UP (ref 1.8–7.4)
NEUTROPHILS # BLD AUTO: 2.25 K/UL — SIGNIFICANT CHANGE UP (ref 1.8–7.4)
NEUTROPHILS NFR BLD AUTO: 45.8 % — SIGNIFICANT CHANGE UP (ref 43–77)
NEUTROPHILS NFR BLD AUTO: 45.8 % — SIGNIFICANT CHANGE UP (ref 43–77)
NRBC # BLD: 0 /100 WBCS — SIGNIFICANT CHANGE UP (ref 0–0)
NRBC # BLD: 0 /100 WBCS — SIGNIFICANT CHANGE UP (ref 0–0)
PLATELET # BLD AUTO: 355 K/UL — SIGNIFICANT CHANGE UP (ref 150–400)
PLATELET # BLD AUTO: 355 K/UL — SIGNIFICANT CHANGE UP (ref 150–400)
POTASSIUM SERPL-MCNC: 4.2 MMOL/L — SIGNIFICANT CHANGE UP (ref 3.5–5.3)
POTASSIUM SERPL-MCNC: 4.2 MMOL/L — SIGNIFICANT CHANGE UP (ref 3.5–5.3)
POTASSIUM SERPL-SCNC: 4.2 MMOL/L — SIGNIFICANT CHANGE UP (ref 3.5–5.3)
POTASSIUM SERPL-SCNC: 4.2 MMOL/L — SIGNIFICANT CHANGE UP (ref 3.5–5.3)
PROT SERPL-MCNC: 8.4 G/DL — HIGH (ref 6–8.3)
PROT SERPL-MCNC: 8.4 G/DL — HIGH (ref 6–8.3)
RBC # BLD: 4.61 M/UL — SIGNIFICANT CHANGE UP (ref 3.8–5.2)
RBC # BLD: 4.61 M/UL — SIGNIFICANT CHANGE UP (ref 3.8–5.2)
RBC # FLD: 13.1 % — SIGNIFICANT CHANGE UP (ref 10.3–14.5)
RBC # FLD: 13.1 % — SIGNIFICANT CHANGE UP (ref 10.3–14.5)
SODIUM SERPL-SCNC: 138 MMOL/L — SIGNIFICANT CHANGE UP (ref 135–145)
SODIUM SERPL-SCNC: 138 MMOL/L — SIGNIFICANT CHANGE UP (ref 135–145)
WBC # BLD: 4.91 K/UL — SIGNIFICANT CHANGE UP (ref 3.8–10.5)
WBC # BLD: 4.91 K/UL — SIGNIFICANT CHANGE UP (ref 3.8–10.5)
WBC # FLD AUTO: 4.91 K/UL — SIGNIFICANT CHANGE UP (ref 3.8–10.5)
WBC # FLD AUTO: 4.91 K/UL — SIGNIFICANT CHANGE UP (ref 3.8–10.5)

## 2023-10-17 PROCEDURE — 70450 CT HEAD/BRAIN W/O DYE: CPT | Mod: 26,MA

## 2023-10-17 PROCEDURE — 99285 EMERGENCY DEPT VISIT HI MDM: CPT

## 2023-10-17 PROCEDURE — 70450 CT HEAD/BRAIN W/O DYE: CPT | Mod: MA

## 2023-10-17 PROCEDURE — 85025 COMPLETE CBC W/AUTO DIFF WBC: CPT

## 2023-10-17 PROCEDURE — 80053 COMPREHEN METABOLIC PANEL: CPT

## 2023-10-17 PROCEDURE — 70553 MRI BRAIN STEM W/O & W/DYE: CPT | Mod: MA

## 2023-10-17 PROCEDURE — 93005 ELECTROCARDIOGRAM TRACING: CPT

## 2023-10-17 PROCEDURE — 70553 MRI BRAIN STEM W/O & W/DYE: CPT | Mod: 26,MA

## 2023-10-17 PROCEDURE — 84702 CHORIONIC GONADOTROPIN TEST: CPT

## 2023-10-17 PROCEDURE — A9579: CPT

## 2023-10-17 PROCEDURE — 93010 ELECTROCARDIOGRAM REPORT: CPT

## 2023-10-17 PROCEDURE — 36415 COLL VENOUS BLD VENIPUNCTURE: CPT

## 2023-10-17 NOTE — ED ADULT TRIAGE NOTE - CHIEF COMPLAINT QUOTE
pt ambulatory to triage a&ox4 with complaints of intermittent left facial numbness since 9 pm last night- woke up today with some soreness to left leg and now has numbness to left foot. denies headache/dizziness. +PERRL moving all extremities equal strength, speech clear, no facial droop/arm drift.

## 2023-10-17 NOTE — ED PROVIDER NOTE - PATIENT PORTAL LINK FT
You can access the FollowMyHealth Patient Portal offered by [facility] by registering at the following website: [email or web address]. By joining [employer]’s FollowMyHealth portal, you will also be able to view your health information using other applications (apps) compatible with our system.

## 2023-10-17 NOTE — ED PROVIDER NOTE - ATTENDING APP SHARED VISIT CONTRIBUTION OF CARE
29yo female with left facial tingling since last night, no weakness no slurred speech, no dizziness or double vision  exam: NIHSS 0, lungs cta, rrr  plan: ekg, labs, ct head  agree with assessment and plan of PA

## 2023-10-17 NOTE — ED PROVIDER NOTE - NSFOLLOWUPINSTRUCTIONS_ED_ALL_ED_FT
1) Follow up with Dr. Faria tomorrow or Thursday  2) Return to the ED immediately for new or worsening symptoms as we discussed.    English    Paresthesia  Paresthesia is an abnormal burning or prickling sensation. It is usually felt in the hands, arms, legs, or feet. However, it may occur in any part of the body. Usually, paresthesia is not painful. It may feel like:  Tingling or numbness.  Buzzing.  Itching.  Paresthesia may occur without any clear cause, or it may be caused by:  Breathing too quickly (hyperventilation).  Pressure on a nerve.  An underlying medical condition.  Side effects of a medicine.  Nutritional deficiencies.  Exposure to toxic chemicals.  Most people experience temporary (transient) paresthesia at some time in their lives. For some people, it may be long-lasting (chronic) because of an underlying medical condition. If you have paresthesia that lasts a long time, you need to be evaluated by your health care provider.    Follow these instructions at home:  Nutrition    A plate with examples of foods in a healthy diet.  Eat a healthy diet. This includes:  Eating foods that are high in fiber, such as beans, whole grains, and fresh fruits and vegetables.  Limiting foods that are high in fat and processed sugars, such as fried or sweet foods.  Alcohol use    A sign showing that a person should not drink alcohol.  Avoid or limit alcohol. Too much alcohol can cause a vitamin B deficiency, and vitamin B is needed for healthy nerves.  Do not drink alcohol if:  Your health care provider tells you not to drink.  You are pregnant, may be pregnant, or are planning to become pregnant.  If you drink alcohol:  Limit how much you have to:  0–1 drink a day for women.  0–2 drinks a day for men.  Know how much alcohol is in your drink. In the [place] one drink equals one 12 oz bottle of beer (355 mL), one 5 oz glass of wine (148 mL), or one 1½ oz glass of hard liquor (44 mL).  General instructions    Take over-the-counter and prescription medicines only as told by your health care provider.  Do not use any products that contain nicotine or tobacco. These products include cigarettes, chewing tobacco, and vaping devices, such as e-cigarettes. If you need help quitting, ask your health care provider.  If you have diabetes, work closely with your health care provider to keep your blood sugar under control.  If you have numbness in your feet:  Check every day for signs of injury or infection. Watch for redness, warmth, and swelling.  Wear padded socks and comfortable shoes. These help protect your feet.  Keep all follow-up visits. This is important.  Contact a health care provider if you:  Have paresthesia that gets worse or does not go away.  Have numbness after an injury.  Have a burning or prickling feeling that gets worse when you walk.  Have pain, cramps, or dizziness, or you faint.  Develop a rash.  Get help right away if you:  Feel muscle weakness.  Develop new weakness in an arm or leg.  Have trouble walking or moving.  Have problems with speech, understanding, or vision.  Feel confused.  Cannot control your bladder or bowel movements.  These symptoms may be an emergency. Get help right away. Call 911.  Do not wait to see if the symptoms will go away.  Do not drive yourself to the hospital.  Summary  Paresthesia is an abnormal burning or prickling sensation that is usually felt in the hands, arms, legs, or feet. It may also occur in other parts of the body.  Paresthesia may occur without any clear cause, or it may be caused by breathing too quickly (hyperventilation), pressure on a nerve, an underlying medical condition, side effects of a medicine, nutritional deficiencies, or exposure to toxic chemicals.  If you have paresthesia that lasts a long time, you need to be evaluated by your health care provider.  This information is not intended to replace advice given to you by your health care provider. Make sure you discuss any questions you have with your health care provider.    Document Revised: 08/29/2022 Document Reviewed: 08/29/2022  Elsevier Patient Education © 2023 Elsevier Inc.

## 2023-10-17 NOTE — ED PROVIDER NOTE - CHIEF COMPLAINT
Clear bilaterally, pupils equal, round and reactive to light.
The patient is a 28y Female complaining of facial numbness.

## 2023-10-17 NOTE — ED PROVIDER NOTE - CARE PROVIDER_API CALL
Chuy Faria  Neurology  [address] [place] [address]  Phone: [phone]  Fax: [phone]  Follow Up Time:

## 2023-10-17 NOTE — ED PROVIDER NOTE - DIFFERENTIAL DIAGNOSIS
Differential Diagnosis ddx considered but not limited to tia, paresthesias, electrolyte imbalance, bells

## 2023-10-17 NOTE — ED PROVIDER NOTE - OBJECTIVE STATEMENT
27 yo female PMHx PVCs presents to ED c/o left periorbital tingling sensation x last night, sudden onset ~9PM. Lasted for a few mins, resolved, pt went to sleep. States she woke up with similar paresthesia to left shin into dorsum of left foot, also lasted for a few mins and resolved with exception of tingling to foot which has been constant. Faint periorbital tingling recurred this morning and has been intermittent for the last few hours. Pt reports hx chronic intermittent low back pain s/p  3 months ago. Admits to episode last night of sharp left sided lumbar pain which resolved this morning. Hx migraines, but denies headache today. Pt also denies visual changes, weakness, chest pain, palpitations, SOB, abd pain, N/V/D, fever/chills, neck pain. PERC neg.

## 2023-10-17 NOTE — ED PROVIDER NOTE - PROGRESS NOTE DETAILS
pt asymptomatic at this time, requesting dc. will contact puma for fu. Discussed the results of all diagnostic testing in ED and copies of all reports given.   Pt was given an opportunity to have all questions answered to satisfaction.  Discussed the importance of prompt, close medical follow-up. ED return precautions discussed at length.  Pt verbalizes agreement and understanding of plan and ED return precautions. Pt well appearing, stable for DC home. No emergent concerns at this time.

## 2023-10-17 NOTE — ED PROVIDER NOTE - NS ED ATTENDING STATEMENT MOD
This was a shared visit with the AXEL. I reviewed and verified the documentation and independently performed the documented:

## 2023-10-17 NOTE — ED PROVIDER NOTE - NOTES
discussed case. Recs: offer pt MRI here or outpatient then have her f/u in his office tomorrow or thursday

## 2023-11-03 ENCOUNTER — RX RENEWAL (OUTPATIENT)
Age: 28
End: 2023-11-03

## 2023-11-03 RX ORDER — CLINDAMYCIN PHOSPHATE 1 G/10ML
1 GEL TOPICAL TWICE DAILY
Qty: 75 | Refills: 0 | Status: ACTIVE | COMMUNITY
Start: 2023-10-03 | End: 1900-01-01

## 2023-12-11 ENCOUNTER — APPOINTMENT (OUTPATIENT)
Dept: ULTRASOUND IMAGING | Facility: IMAGING CENTER | Age: 28
End: 2023-12-11

## 2024-05-09 ENCOUNTER — APPOINTMENT (OUTPATIENT)
Dept: OBGYN | Facility: CLINIC | Age: 29
End: 2024-05-09
Payer: SELF-PAY

## 2024-05-09 VITALS
HEIGHT: 61 IN | DIASTOLIC BLOOD PRESSURE: 81 MMHG | SYSTOLIC BLOOD PRESSURE: 123 MMHG | WEIGHT: 147 LBS | BODY MASS INDEX: 27.75 KG/M2

## 2024-05-09 DIAGNOSIS — Z01.419 ENCOUNTER FOR GYNECOLOGICAL EXAMINATION (GENERAL) (ROUTINE) W/OUT ABNORMAL FINDINGS: ICD-10-CM

## 2024-05-09 DIAGNOSIS — K64.9 UNSPECIFIED HEMORRHOIDS: ICD-10-CM

## 2024-05-09 DIAGNOSIS — N64.4 MASTODYNIA: ICD-10-CM

## 2024-05-09 DIAGNOSIS — Z87.42 PERSONAL HISTORY OF OTHER DISEASES OF THE FEMALE GENITAL TRACT: ICD-10-CM

## 2024-05-09 PROCEDURE — 99459 PELVIC EXAMINATION: CPT

## 2024-05-09 PROCEDURE — 99395 PREV VISIT EST AGE 18-39: CPT

## 2024-05-09 NOTE — PLAN
[FreeTextEntry1] : 28 year  y/o P1 presenting for annual exam and breast pain  -f/u pap and GC/CT -Clinical breast exam benign -Explained to patient that cyclical breast pain is likely attributed to fibrocystic changes -Recommend Tylenol, Motrin and hot packs for symptomatic relief, reduction in caffeine intake -As patient is less than 39 y/o, will obtain breast sonogram for complete evaluation -Contraception: not sexually active at this time  - STD panel sent  -f/u PRN

## 2024-05-09 NOTE — HISTORY OF PRESENT ILLNESS
[N] : Patient is not sexually active [Y] : Positive pregnancy history [LMPDate] : 05/04/2024 [MensesFreq] : 30 [MensesLength] : 5-6 [PGxTotal] : 1 [[facility] Center[facility]erm] : 1 [PGHxPremature] : 0 [PGHxAbortions] : 0 [[facility] Centeriving] : 1 [FreeTextEntry1] : NSVDX1  [Diffused Pain] : diffused pain [Intermittent] : intermittent [Mild] : mild [Previously active] : previously active [Men] : men [Vaginal] : vaginal [No] : No [Patient would like to be screened for STIs] : Patient would like to be screened for STIs

## 2024-05-09 NOTE — PHYSICAL EXAM
[Chaperone Present] : A chaperone was present in the examining room during all aspects of the physical examination [[id number]] : A chaperone was present during the pelvic exam. [FreeTextEntry2] : Chinyere [Appropriately responsive] : appropriately responsive [Alert] : alert [No Acute Distress] : no acute distress [No Lymphadenopathy] : no lymphadenopathy [Soft] : soft [Non-tender] : non-tender [Non-distended] : non-distended [No HSM] : No HSM [No Lesions] : no lesions [No Mass] : no mass [Oriented x3] : oriented x3 [Examination Of The Breasts] : a normal appearance [No Masses] : no breast masses were palpable [Labia Majora] : normal [Labia Minora] : normal [Normal] : normal [Uterine Adnexae] : normal

## 2024-05-10 LAB — HIV1+2 AB SPEC QL IA.RAPID: NONREACTIVE

## 2024-05-11 LAB
C TRACH RRNA SPEC QL NAA+PROBE: NOT DETECTED
CANDIDA VAG CYTO: NOT DETECTED
G VAGINALIS+PREV SP MTYP VAG QL MICRO: NOT DETECTED
HAV IGM SER QL: NONREACTIVE
HBV CORE IGM SER QL: NONREACTIVE
HBV SURFACE AG SER QL: NONREACTIVE
HCV AB SER QL: NONREACTIVE
HCV S/CO RATIO: 0.3 S/CO
N GONORRHOEA RRNA SPEC QL NAA+PROBE: NOT DETECTED
SOURCE AMPLIFICATION: NORMAL
T PALLIDUM AB SER QL IA: NEGATIVE
T VAGINALIS VAG QL WET PREP: NOT DETECTED

## 2024-05-13 NOTE — LACTATION INITIAL EVALUATION - MILK SUPPLY
Patient contacted the office to inform provider he is still at work and will not be able to attend today's session. Patient rescheduled for later this week.   
colostrum

## 2024-05-15 ENCOUNTER — NON-APPOINTMENT (OUTPATIENT)
Age: 29
End: 2024-05-15

## 2024-05-15 LAB
CYTOLOGY CVX/VAG DOC THIN PREP: NORMAL
HSV 1+2 IGG SER IA-IMP: NEGATIVE
HSV 1+2 IGG SER IA-IMP: POSITIVE
HSV1 IGG SER QL: 51.2 INDEX
HSV2 IGG SER QL: 0.16 INDEX

## 2024-05-25 ENCOUNTER — EMERGENCY (EMERGENCY)
Facility: HOSPITAL | Age: 29
LOS: 1 days | Discharge: HOME | End: 2024-05-25
Attending: STUDENT IN AN ORGANIZED HEALTH CARE EDUCATION/TRAINING PROGRAM | Admitting: STUDENT IN AN ORGANIZED HEALTH CARE EDUCATION/TRAINING PROGRAM
Payer: COMMERCIAL

## 2024-05-25 VITALS
OXYGEN SATURATION: 96 % | SYSTOLIC BLOOD PRESSURE: 109 MMHG | RESPIRATION RATE: 16 BRPM | DIASTOLIC BLOOD PRESSURE: 75 MMHG | TEMPERATURE: 98 F | HEART RATE: 63 BPM | WEIGHT: 143.08 LBS

## 2024-05-25 DIAGNOSIS — O34.30 MATERNAL CARE FOR CERVICAL INCOMPETENCE, UNSPECIFIED TRIMESTER: Chronic | ICD-10-CM

## 2024-05-25 LAB
FLUAV AG NPH QL: SIGNIFICANT CHANGE UP
FLUBV AG NPH QL: SIGNIFICANT CHANGE UP
HMPV RNA SPEC QL NAA+PROBE: DETECTED
RAPID RVP RESULT: DETECTED
RSV RNA NPH QL NAA+NON-PROBE: SIGNIFICANT CHANGE UP
SARS-COV-2 RNA SPEC QL NAA+PROBE: SIGNIFICANT CHANGE UP
SARS-COV-2 RNA SPEC QL NAA+PROBE: SIGNIFICANT CHANGE UP

## 2024-05-25 PROCEDURE — 0225U NFCT DS DNA&RNA 21 SARSCOV2: CPT

## 2024-05-25 PROCEDURE — 87637 SARSCOV2&INF A&B&RSV AMP PRB: CPT

## 2024-05-25 PROCEDURE — 99053 MED SERV 10PM-8AM 24 HR FAC: CPT

## 2024-05-25 PROCEDURE — 99283 EMERGENCY DEPT VISIT LOW MDM: CPT

## 2024-05-25 NOTE — ED PROVIDER NOTE - NSFOLLOWUPINSTRUCTIONS_ED_ALL_ED_FT
You most likely have a viral syndrome. This can last from 5-10 days. Alternate Tylenol and Motrin every 4-6 hours for fever control as well as body aches and chills. Drink plenty of fluids. Rest. Return to the emergency room for worsening condition or new concerning symptoms. Follow up with your regular doctor. If you don't have a regular doctor use one of the numbers below to establish a primary care doctor.    A viral infection can be caused by different types of viruses. Most viral infections are not serious and resolve on their own. However, some infections may cause severe symptoms and may lead to further complications.    SYMPTOMS  Viruses can frequently cause: Minor sore throat. Aches and pains. Headaches. Runny nose. Different types of rashes. Watery eyes. Tiredness. Cough. Loss of appetite. Gastrointestinal infections, resulting in nausea, vomiting, and diarrhea. These symptoms do not respond to antibiotics because the infection is not caused by bacteria. However, you might catch a bacterial infection following the viral infection. This is sometimes called a "superinfection." Symptoms of such a bacterial infection may include: Worsening sore throat with pus and difficulty swallowing. Swollen neck glands. Chills and a high or persistent fever. Severe headache. Tenderness over the sinuses. Persistent overall ill feeling (malaise), muscle aches, and tiredness (fatigue). Persistent cough. Yellow, green, or brown mucus production with coughing.    HOME CARE INSTRUCTIONS  Only take over-the-counter or prescription medicines for pain, discomfort, diarrhea, or fever as directed by your caregiver. Drink enough water and fluids to keep your urine clear or pale yellow. Sports drinks can provide valuable electrolytes, sugars, and hydration. Get plenty of rest and maintain proper nutrition. Soups and broths with crackers or rice are fine.    SEEK IMMEDIATE MEDICAL CARE IF:  You have severe headaches, shortness of breath, chest pain, neck pain, or an unusual rash. You have uncontrolled vomiting, diarrhea, or you are unable to keep down fluids.

## 2024-05-25 NOTE — ED PROVIDER NOTE - PHYSICAL EXAMINATION
GEN - NAD, well appearing, A&Ox3  HEAD - NC/AT  EYES - PERRL, EOMI  ENT - Airway patent, mucous membranes moist  PULMONARY - CTA b/l, symmetric breath sounds, no W/R/R  CARDIAC - +S1S2, RRR, no M/G/R, no JVD  ABDOMEN - +BS, ND, NT, soft, no guarding, no rebound, no masses, no rigidity   - No CVA TTP b/l  EXTREMITIES - FROM, symmetric pulses, no edema  SKIN - No rash or bruising  NEUROLOGIC - Alert, speech clear, no focal deficits  PSYCH - Normal mood/affect, normal insight

## 2024-05-25 NOTE — ED PROVIDER NOTE - IV ALTEPLASE EXCL ABS HIDDEN
From: Anna Loo  To: Bro Rea MD  Sent: 5/1/2023 9:36 PM CDT  Subject: Er after visit    After visit at emergency room I have a concern of test results I received at er they sent me home with no antibiotics but I still feel like crap my head is hurting my back of my neck I feel really bad show

## 2024-05-25 NOTE — ED ADULT TRIAGE NOTE - CHIEF COMPLAINT QUOTE
Patient came in ambulatory to triage c/o cough for 3 days and nasal congestion started 2 days ago. Patient requesting to be swab for covid and RVP.

## 2024-05-25 NOTE — ED PROVIDER NOTE - PATIENT PORTAL LINK FT
You can access the FollowMyHealth Patient Portal offered by [facility] by registering at the following website: [email or web address]. By joining [employer]’s FollowMyHealth portal, you will also be able to view your health information using other applications (apps) compatible with our system.

## 2024-05-25 NOTE — ED PROVIDER NOTE - OBJECTIVE STATEMENT
The patient is a 28y Female with no sig pmhx p/w dry cough and nasal congestion for the last 2-3 days. 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 Says that she was exposed to a covid pt a few days ago. Denies fever, cp, sob, abd pain, n/v/d, leg swelling, urinary symptoms, vaginal bleeding. Pt is vaccinated for covid. Pt requesting a viral swab.

## 2024-05-25 NOTE — ED PROVIDER NOTE - CLINICAL SUMMARY MEDICAL DECISION MAKING FREE TEXT BOX
Amadeo Perdomo MD (Attending Physician): The patient is a 28y Female with no sig pmhx p/w dry cough and nasal congestion for the last 2-3 days. Pt most likely has viral syndrome. Low suspicion for PNA. Pt doesn't want any pain meds at this time. RVP swab. Discharge home with outpatient f/u.

## 2024-05-28 ENCOUNTER — OUTPATIENT (OUTPATIENT)
Dept: OUTPATIENT SERVICES | Facility: HOSPITAL | Age: 29
LOS: 1 days | End: 2024-05-28
Payer: COMMERCIAL

## 2024-05-28 ENCOUNTER — APPOINTMENT (OUTPATIENT)
Dept: ULTRASOUND IMAGING | Facility: CLINIC | Age: 29
End: 2024-05-28
Payer: COMMERCIAL

## 2024-05-28 ENCOUNTER — RESULT REVIEW (OUTPATIENT)
Age: 29
End: 2024-05-28

## 2024-05-28 DIAGNOSIS — Z00.8 ENCOUNTER FOR OTHER GENERAL EXAMINATION: ICD-10-CM

## 2024-05-28 DIAGNOSIS — N64.4 MASTODYNIA: ICD-10-CM

## 2024-05-28 DIAGNOSIS — O34.30 MATERNAL CARE FOR CERVICAL INCOMPETENCE, UNSPECIFIED TRIMESTER: Chronic | ICD-10-CM

## 2024-05-28 PROCEDURE — 76641 ULTRASOUND BREAST COMPLETE: CPT

## 2024-05-28 PROCEDURE — 76641 ULTRASOUND BREAST COMPLETE: CPT | Mod: 26,50

## 2024-05-29 DIAGNOSIS — B00.9 HERPESVIRAL INFECTION, UNSPECIFIED: ICD-10-CM

## 2024-05-29 RX ORDER — VALACYCLOVIR 1 G/1
1 TABLET, FILM COATED ORAL
Qty: 5 | Refills: 12 | Status: ACTIVE | COMMUNITY
Start: 2024-05-29 | End: 1900-01-01

## 2025-02-03 DIAGNOSIS — J02.9 ACUTE PHARYNGITIS, UNSPECIFIED: ICD-10-CM

## 2025-02-03 DIAGNOSIS — R50.9 FEVER, UNSPECIFIED: ICD-10-CM

## 2025-02-07 LAB — BACTERIA THROAT CULT: NORMAL

## 2025-02-27 DIAGNOSIS — R50.9 FEVER, UNSPECIFIED: ICD-10-CM

## 2025-06-04 NOTE — OB RN PATIENT PROFILE - FUNCTIONAL ASSESSMENT - BASIC MOBILITY 2.
Medication passed protocol.          Medication:    Lancets (freestyle) Misc    passed protocol.   Last office visit date: 04/24/2025  Next appointment scheduled?: Yes 11/17/2025      4 = No assist / stand by assistance

## 2025-06-20 RX ORDER — METHYLPREDNISOLONE 4 MG/1
4 TABLET ORAL
Qty: 1 | Refills: 0 | Status: ACTIVE | COMMUNITY
Start: 2025-06-20 | End: 1900-01-01

## 2025-06-20 RX ORDER — AMOXICILLIN AND CLAVULANATE POTASSIUM 875; 125 MG/1; MG/1
875-125 TABLET, COATED ORAL
Qty: 20 | Refills: 0 | Status: ACTIVE | COMMUNITY
Start: 2025-06-20 | End: 1900-01-01

## 2025-09-10 ENCOUNTER — APPOINTMENT (OUTPATIENT)
Dept: OBGYN | Facility: CLINIC | Age: 30
End: 2025-09-10
Payer: COMMERCIAL

## 2025-09-10 VITALS
DIASTOLIC BLOOD PRESSURE: 68 MMHG | HEIGHT: 61 IN | SYSTOLIC BLOOD PRESSURE: 113 MMHG | BODY MASS INDEX: 27.19 KG/M2 | WEIGHT: 144 LBS

## 2025-09-10 DIAGNOSIS — Z01.419 ENCOUNTER FOR GYNECOLOGICAL EXAMINATION (GENERAL) (ROUTINE) W/OUT ABNORMAL FINDINGS: ICD-10-CM

## 2025-09-10 DIAGNOSIS — Z80.41 FAMILY HISTORY OF MALIGNANT NEOPLASM OF OVARY: ICD-10-CM

## 2025-09-10 DIAGNOSIS — Z78.9 OTHER SPECIFIED HEALTH STATUS: ICD-10-CM

## 2025-09-10 DIAGNOSIS — Z3A.35 35 WEEKS GESTATION OF PREGNANCY: ICD-10-CM

## 2025-09-10 DIAGNOSIS — Z11.3 ENCOUNTER FOR SCREENING FOR INFECTIONS WITH A PREDOMINANTLY SEXUAL MODE OF TRANSMISSION: ICD-10-CM

## 2025-09-10 PROCEDURE — 36415 COLL VENOUS BLD VENIPUNCTURE: CPT

## 2025-09-10 PROCEDURE — 99459 PELVIC EXAMINATION: CPT | Mod: NC

## 2025-09-10 PROCEDURE — 99395 PREV VISIT EST AGE 18-39: CPT

## 2025-09-11 LAB
BASOPHILS # BLD AUTO: 0.02 K/UL
BASOPHILS NFR BLD AUTO: 0.3 %
EOSINOPHIL # BLD AUTO: 0.1 K/UL
EOSINOPHIL NFR BLD AUTO: 1.7 %
HCT VFR BLD CALC: 44.4 %
HGB BLD-MCNC: 13.5 G/DL
IMM GRANULOCYTES NFR BLD AUTO: 0 %
LYMPHOCYTES # BLD AUTO: 2.27 K/UL
LYMPHOCYTES NFR BLD AUTO: 37.6 %
MAN DIFF?: NORMAL
MCHC RBC-ENTMCNC: 29.3 PG
MCHC RBC-ENTMCNC: 30.4 G/DL
MCV RBC AUTO: 96.5 FL
MONOCYTES # BLD AUTO: 0.34 K/UL
MONOCYTES NFR BLD AUTO: 5.6 %
NEUTROPHILS # BLD AUTO: 3.31 K/UL
NEUTROPHILS NFR BLD AUTO: 54.8 %
PLATELET # BLD AUTO: 284 K/UL
RBC # BLD: 4.6 M/UL
RBC # FLD: 12.9 %
WBC # FLD AUTO: 6.04 K/UL

## 2025-09-15 LAB
C TRACH RRNA SPEC QL NAA+PROBE: NOT DETECTED
HBV SURFACE AG SER QL: NONREACTIVE
HIV1+2 AB SPEC QL IA.RAPID: NONREACTIVE
HPV 16 E6+E7 MRNA CVX QL NAA+PROBE: NOT DETECTED
HPV HIGH+LOW RISK DNA PNL CVX: NOT DETECTED
HPV HIGH+LOW RISK DNA PNL CVX: NOT DETECTED
HPV18+45 E6+E7 MRNA CVX QL NAA+PROBE: NOT DETECTED
HSV 1+2 IGG SER IA-IMP: NEGATIVE
HSV 1+2 IGG SER IA-IMP: POSITIVE
HSV1 IGG SER QL: 53.9 INDEX
HSV2 IGG SER QL: 0.07 INDEX
N GONORRHOEA RRNA SPEC QL NAA+PROBE: NOT DETECTED
SOURCE AMPLIFICATION: NORMAL
T PALLIDUM AB SER QL IA: NEGATIVE

## 2025-09-18 LAB — CYTOLOGY CVX/VAG DOC THIN PREP: NORMAL

## 2025-09-19 DIAGNOSIS — R05.9 COUGH, UNSPECIFIED: ICD-10-CM

## 2025-09-19 RX ORDER — AZITHROMYCIN 250 MG/1
250 TABLET, FILM COATED ORAL
Qty: 1 | Refills: 0 | Status: ACTIVE | COMMUNITY
Start: 2025-09-19 | End: 1900-01-01